# Patient Record
Sex: FEMALE | Race: WHITE | Employment: OTHER | URBAN - METROPOLITAN AREA
[De-identification: names, ages, dates, MRNs, and addresses within clinical notes are randomized per-mention and may not be internally consistent; named-entity substitution may affect disease eponyms.]

---

## 2018-02-26 ENCOUNTER — HOSPITAL ENCOUNTER (OUTPATIENT)
Dept: CT IMAGING | Age: 73
Discharge: HOME | End: 2018-02-26
Attending: ORTHOPAEDIC SURGERY
Payer: MEDICARE

## 2018-02-26 DIAGNOSIS — Z96.652 STATUS POST TOTAL LEFT KNEE REPLACEMENT: ICD-10-CM

## 2018-02-26 PROCEDURE — 73700 CT LOWER EXTREMITY W/O DYE: CPT

## 2018-11-05 ENCOUNTER — HOSPITAL ENCOUNTER (OUTPATIENT)
Dept: MRI IMAGING | Age: 73
Discharge: HOME | End: 2018-11-05
Attending: PAIN MEDICINE
Payer: MEDICARE

## 2018-11-05 ENCOUNTER — HOSPITAL ENCOUNTER (OUTPATIENT)
Dept: GENERAL RADIOLOGY | Age: 73
Discharge: HOME | End: 2018-11-05
Attending: PAIN MEDICINE
Payer: MEDICARE

## 2018-11-05 DIAGNOSIS — M46.1 SACROILIITIS, NOT ELSEWHERE CLASSIFIED (HCC): ICD-10-CM

## 2018-11-05 DIAGNOSIS — M48.07 SPINAL STENOSIS, LUMBOSACRAL REGION: ICD-10-CM

## 2018-11-05 DIAGNOSIS — G89.4 CHRONIC PAIN SYNDROME: ICD-10-CM

## 2018-11-05 DIAGNOSIS — M48.061 SPINAL STENOSIS, LUMBAR REGION, WITHOUT NEUROGENIC CLAUDICATION: ICD-10-CM

## 2018-11-05 DIAGNOSIS — M54.17 RADICULOPATHY, LUMBOSACRAL REGION: ICD-10-CM

## 2018-11-05 DIAGNOSIS — M54.16 RADICULOPATHY, LUMBAR REGION: ICD-10-CM

## 2018-11-05 PROCEDURE — 72148 MRI LUMBAR SPINE W/O DYE: CPT

## 2018-11-05 PROCEDURE — 72114 X-RAY EXAM L-S SPINE BENDING: CPT

## 2020-01-21 ENCOUNTER — HOSPITAL ENCOUNTER (OUTPATIENT)
Dept: PREADMISSION TESTING | Age: 75
Discharge: HOME | End: 2020-01-21
Payer: MEDICARE

## 2020-01-21 VITALS
TEMPERATURE: 98.3 F | SYSTOLIC BLOOD PRESSURE: 200 MMHG | BODY MASS INDEX: 30.55 KG/M2 | OXYGEN SATURATION: 96 % | HEART RATE: 106 BPM | RESPIRATION RATE: 18 BRPM | DIASTOLIC BLOOD PRESSURE: 100 MMHG | HEIGHT: 70 IN | WEIGHT: 213.44 LBS

## 2020-01-21 LAB
25(OH)D3 SERPL-MCNC: 54.6 NG/ML (ref 30–100)
ABO + RH BLD: NORMAL
ALBUMIN SERPL-MCNC: 3.8 G/DL (ref 3.5–5)
ALBUMIN/GLOB SERPL: 1.1 {RATIO} (ref 1.1–2.2)
ALP SERPL-CCNC: 64 U/L (ref 45–117)
ALT SERPL-CCNC: 21 U/L (ref 12–78)
ANION GAP SERPL CALC-SCNC: 4 MMOL/L (ref 5–15)
APPEARANCE UR: CLEAR
APTT PPP: 25.1 SEC (ref 22.1–32)
AST SERPL-CCNC: 18 U/L (ref 15–37)
ATRIAL RATE: 94 BPM
BACTERIA URNS QL MICRO: NEGATIVE /HPF
BASOPHILS # BLD: 0 K/UL (ref 0–0.1)
BASOPHILS NFR BLD: 1 % (ref 0–1)
BILIRUB SERPL-MCNC: 0.5 MG/DL (ref 0.2–1)
BILIRUB UR QL: NEGATIVE
BLOOD GROUP ANTIBODIES SERPL: NORMAL
BUN SERPL-MCNC: 22 MG/DL (ref 6–20)
BUN/CREAT SERPL: 31 (ref 12–20)
CALCIUM SERPL-MCNC: 9.4 MG/DL (ref 8.5–10.1)
CALCULATED P AXIS, ECG09: 57 DEGREES
CALCULATED T AXIS, ECG11: 57 DEGREES
CHLORIDE SERPL-SCNC: 109 MMOL/L (ref 97–108)
CO2 SERPL-SCNC: 29 MMOL/L (ref 21–32)
COLOR UR: NORMAL
CREAT SERPL-MCNC: 0.71 MG/DL (ref 0.55–1.02)
DIAGNOSIS, 93000: NORMAL
DIFFERENTIAL METHOD BLD: NORMAL
EOSINOPHIL # BLD: 0 K/UL (ref 0–0.4)
EOSINOPHIL NFR BLD: 1 % (ref 0–7)
EPITH CASTS URNS QL MICRO: NORMAL /LPF
ERYTHROCYTE [DISTWIDTH] IN BLOOD BY AUTOMATED COUNT: 12.9 % (ref 11.5–14.5)
EST. AVERAGE GLUCOSE BLD GHB EST-MCNC: 123 MG/DL
GLOBULIN SER CALC-MCNC: 3.6 G/DL (ref 2–4)
GLUCOSE SERPL-MCNC: 106 MG/DL (ref 65–100)
GLUCOSE UR STRIP.AUTO-MCNC: NEGATIVE MG/DL
HBA1C MFR BLD: 5.9 % (ref 4–5.6)
HCT VFR BLD AUTO: 44.2 % (ref 35–47)
HGB BLD-MCNC: 14.3 G/DL (ref 11.5–16)
HGB UR QL STRIP: NEGATIVE
HYALINE CASTS URNS QL MICRO: NORMAL /LPF (ref 0–5)
IMM GRANULOCYTES # BLD AUTO: 0 K/UL (ref 0–0.04)
IMM GRANULOCYTES NFR BLD AUTO: 0 % (ref 0–0.5)
INR PPP: 1 (ref 0.9–1.1)
KETONES UR QL STRIP.AUTO: NEGATIVE MG/DL
LEUKOCYTE ESTERASE UR QL STRIP.AUTO: NEGATIVE
LYMPHOCYTES # BLD: 1.3 K/UL (ref 0.8–3.5)
LYMPHOCYTES NFR BLD: 33 % (ref 12–49)
MCH RBC QN AUTO: 29.9 PG (ref 26–34)
MCHC RBC AUTO-ENTMCNC: 32.4 G/DL (ref 30–36.5)
MCV RBC AUTO: 92.5 FL (ref 80–99)
MONOCYTES # BLD: 0.3 K/UL (ref 0–1)
MONOCYTES NFR BLD: 8 % (ref 5–13)
NEUTS SEG # BLD: 2.3 K/UL (ref 1.8–8)
NEUTS SEG NFR BLD: 58 % (ref 32–75)
NITRITE UR QL STRIP.AUTO: NEGATIVE
NRBC # BLD: 0 K/UL (ref 0–0.01)
NRBC BLD-RTO: 0 PER 100 WBC
P-R INTERVAL, ECG05: 148 MS
PH UR STRIP: 6.5 [PH] (ref 5–8)
PLATELET # BLD AUTO: 157 K/UL (ref 150–400)
POTASSIUM SERPL-SCNC: 4.8 MMOL/L (ref 3.5–5.1)
PROT SERPL-MCNC: 7.4 G/DL (ref 6.4–8.2)
PROT UR STRIP-MCNC: NEGATIVE MG/DL
PROTHROMBIN TIME: 10.3 SEC (ref 9–11.1)
Q-T INTERVAL, ECG07: 372 MS
QRS DURATION, ECG06: 96 MS
QTC CALCULATION (BEZET), ECG08: 465 MS
RBC # BLD AUTO: 4.78 M/UL (ref 3.8–5.2)
RBC #/AREA URNS HPF: NORMAL /HPF (ref 0–5)
SODIUM SERPL-SCNC: 142 MMOL/L (ref 136–145)
SP GR UR REFRACTOMETRY: <1.005 (ref 1–1.03)
SPECIMEN EXP DATE BLD: NORMAL
THERAPEUTIC RANGE,PTTT: NORMAL SECS (ref 58–77)
UA: UC IF INDICATED,UAUC: NORMAL
UROBILINOGEN UR QL STRIP.AUTO: 0.2 EU/DL (ref 0.2–1)
VENTRICULAR RATE, ECG03: 94 BPM
WBC # BLD AUTO: 4 K/UL (ref 3.6–11)
WBC URNS QL MICRO: NORMAL /HPF (ref 0–4)

## 2020-01-21 PROCEDURE — 82306 VITAMIN D 25 HYDROXY: CPT

## 2020-01-21 PROCEDURE — 80053 COMPREHEN METABOLIC PANEL: CPT

## 2020-01-21 PROCEDURE — 83036 HEMOGLOBIN GLYCOSYLATED A1C: CPT

## 2020-01-21 PROCEDURE — 85025 COMPLETE CBC W/AUTO DIFF WBC: CPT

## 2020-01-21 PROCEDURE — 85730 THROMBOPLASTIN TIME PARTIAL: CPT

## 2020-01-21 PROCEDURE — 86900 BLOOD TYPING SEROLOGIC ABO: CPT

## 2020-01-21 PROCEDURE — 36415 COLL VENOUS BLD VENIPUNCTURE: CPT

## 2020-01-21 PROCEDURE — 81001 URINALYSIS AUTO W/SCOPE: CPT

## 2020-01-21 PROCEDURE — 85610 PROTHROMBIN TIME: CPT

## 2020-01-21 PROCEDURE — 93005 ELECTROCARDIOGRAM TRACING: CPT

## 2020-01-21 RX ORDER — PANTOPRAZOLE SODIUM 40 MG/1
40 TABLET, DELAYED RELEASE ORAL DAILY
COMMUNITY

## 2020-01-21 RX ORDER — CHOLECALCIFEROL TAB 125 MCG (5000 UNIT) 125 MCG
5000 TAB ORAL DAILY
COMMUNITY

## 2020-01-21 RX ORDER — PREGABALIN 75 MG/1
75 CAPSULE ORAL 2 TIMES DAILY
COMMUNITY
End: 2020-05-06

## 2020-01-21 NOTE — H&P
Preoperative Evaluation                     History and Physical with Surgical Risk Stratification     1/21/2020    CC: Low back pain  Surgery: L 2-5 Laminectomy     HPI:   Rhonda Conrad is a 75 y.o. female referred for pre-operative evaluation by Dr. Jones Ortiz for surgery on 1/27/20. Ms. Rishi Edmonds comes to [facility] today for her upcoming low back surgery. HTN: She is well aware, along with her PCP and Cardiologist of her elevated BP when she is in medical situations. Her BP will increase to 200's/100's. She brought in a two page sheet showing daily BP checks at home that are well within normal range, 110-125 SBP. She denies headache or vision changes with her elevated BP. Back pain: She notes she has low back pain that has started to impact her daily life. She is very active and wants to be able to walk without pain. She denies buckling or falls. She notes she has left leg tingling especially after a BM and right leg pain. Sitting she has no pain. The pain will wake her up. Walking too long increases her pain. The patient was evaluated in the surgeon's office and it was determined that the most appropriate plan of care is to proceed with surgical intervention. Patient's PCP Pablo Yu MD    Review of Systems     Constitutional: Negative for chills and fever  HENT: Negative for congestion and sore throat  Eyes: negative for blurred vision and double vision  Respiratory: Negative for cough, shortness of breath and wheezing  Mouth: Negative for loose, broken or chipped teeth. Cardiovascular: Negative for chest pain and palpitations  Gastrointestinal: Negative for abdominal pain, constipation, diarrhea and nausea  Genitourinary: Negative for dysuria and hematuria  Musculoskeletal: Positive for low back pain  Skin: Negative for rash, open wounds. Negative for bruises easily  Neurological: Negative for dizziness, tremors and headaches  Psychiatric: Negative for depression.  The patient is not nervous/anxious. Inherent Risk of Surgery     Surgical risk:  Intermediate  Low:  EIntermediate:   Spinal surgery,High:    Patient Cardiac Risk Assessment     Revised Cardiac Risk Index (RCRI)    Rate if cardiac death, nonfatal MI, nonfatal cardiac arrest by number of risk factor- 0.4%    ANDRA/AHA 2007 Guidelines:   1) Surgery Emergency, Non-cardiac -> to surgery  2) If not, look at clinical predictors    Major Intermediate Minor       Blood Thinner: None    METS     >4 METS Climb a flight of stairs or a hill Walk on level ground at 4 mph Run a short distance Heavy work around house (scrub floors, move furniture)     Other Risk Factors:   Screening for ETOH use:  Done and low risk  Smoking status:   None    Personal or FH of bleeding problems:  No  Personal or FH of blood clots:  No  Personal or FH of anesthesia problems:  Yes    Pulmonary Risk:  Asthma or COPD:  No  Body mass index is 30.63 kg/m². Known Central Sleep apnea  Albumin normal, BUN abnormal    Past Medical, Surgical, Social History     Allergies: Allergies   Allergen Reactions    Adhesive Tape-Silicones Other (comments)     Skin tears       Patient did bring in medication list/bottles to review. Medication Documentation Review Audit     Reviewed by Kandy Solomon RN (Registered Nurse) on 01/21/20 at 1032    Medication Sig Documenting Provider Last Dose Status Taking?   atorvastatin (LIPITOR) 20 mg tablet Take 20 mg by mouth every evening. Indications: high cholesterol Provider, Historical  Active    cetirizine (ZYRTEC) 10 mg tablet Take 10 mg by mouth nightly as needed. Indications: SEASONAL ALLERGIC RHINITIS Provider, Historical  Active    cholecalciferol (VITAMIN D3) (5000 Units/125 mcg) tab tablet Take 5,000 Units by mouth daily. Provider, Historical  Active Yes   coenzyme q10-vitamin e (COQ10 ) 100-100 mg-unit cap Take 200 mg by mouth daily (after breakfast).  Provider, Historical  Active    conjugated estrogens (PREMARIN) 0.3 mg tablet Take 0.3 mg by mouth every evening. Provider, Historical  Active    cyanocobalamin (VITAMIN B-12) 1,000 mcg tablet Take 2,500 mcg by mouth daily (after breakfast). Provider, Historical  Active    eszopiclone (LUNESTA) 2 mg tablet Take 2 mg by mouth nightly as needed. Provider, Historical  Active    levothyroxine (SYNTHROID) 112 mcg tablet Take 112 mcg by mouth every morning. NO GENERIC>>>>MUST HAVE BRAND NAME Provider, Historical  Active            Med Note NILAM Wick Jan 21, 2020 10:03 AM)     liothyronine (CYTOMEL) 5 mcg tablet Take 5 mcg by mouth daily (after lunch). Provider, Historical  Active    losartan (COZAAR) 50 mg tablet Take 50 mg by mouth nightly. Provider, Historical  Active    MULTI-VITAMIN PO Take 1 Tab by mouth daily (after breakfast). Provider, Historical  Active    omega-3 acid ethyl esters (LOVAZA) 1 gram capsule Take 1 g by mouth two (2) times daily (with meals). Provider, Historical  Active            Med Brenda Maria, NILAM Weller Jan 21, 2020 10:26 AM)     pantoprazole (PROTONIX) 40 mg tablet Take 40 mg by mouth daily. Provider, Historical  Active Yes   potassium 99 mg tablet Take 99 mg by mouth daily. Provider, Historical  Active Yes   pregabalin (LYRICA) 75 mg capsule Take 75 mg by mouth two (2) times a day. Provider, Historical  Active Yes   vit A/vit C/vit E/zinc/copper (PRESERVISION AREDS PO) Take 1 Tab by mouth two (2) times a day.  Provider, Historical  Active Yes              Past Medical History:   Diagnosis Date    Allergic rhinitis     Arthritis     Osteo    Central sleep apnea     uses a mouth piece    Chronic pain     arthritis pain    Difficult intubation 2011    during total surgery at [facility] 2011- anesthesia note in [facility] care reported no complications      GERD (gastroesophageal reflux disease)     Hx of echocardiogram 01/28/2019    Normal- Kelly Rico    Hyperlipidemia     Hypertension     Well controlled with medications- see BP list in chart    Melanoma ([facility] [address]  & 2015    x 3    PONV (postoperative nausea and vomiting)     Prediabetes 2020    5.9    Thyroid cancer ([facility] [address] 2013    White coat syndrome with hypertension     her BP will increase to 200's/100's- Cardio and PCP aware, runs normal at home     Past Surgical History:   Procedure Laterality Date    HX CHOLECYSTECTOMY  2006    HX DILATION AND CURETTAGE      over 36 years ago    HX HIP REPLACEMENT Right 2001    HX HYSTERECTOMY  2012    HX KNEE REPLACEMENT Left 2015    HX MALIGNANT SKIN LESION EXCISION  ,,    HX MENISCUS REPAIR Left 2015    HX SHOULDER REPLACEMENT Bilateral  &     HX THYROIDECTOMY  2013     Social History     Tobacco Use    Smoking status: Former Smoker     Packs/day: 0.25     Years: 1.00     Pack years: 0.25     Types: Cigarettes     Last attempt to quit: 1962     Years since quittin.3    Smokeless tobacco: Never Used   Substance Use Topics    Alcohol use: No    Drug use: No     Family History   Problem Relation Age of Onset    Heart Disease Father     Heart Attack Father     Heart Disease Sister     Diabetes Sister     Heart Attack Sister 48    Dementia Mother     Post-op Nausea/Vomiting Daughter     Anesth Problems Neg Hx     Deep Vein Thrombosis Neg Hx        Objective     Vitals:    20 1007   BP: (!) 200/100   Pulse: (!) 106   Resp: 18   Temp: 98.3 °F (36.8 °C)   SpO2: 96%   Weight: 96.8 kg (213 lb 7 oz)   Height: 5' 10\" (1.778 m)       Constitutional:  Appears well,  No Acute Distress, Vitals noted  Psychiatric:   Affect normal, Alert and Oriented to person/place/time    Eyes:   Pupils equally round and reactive, EOMI, conjunctiva clear, eyelids normal  ENT:   External ears and nose normal/lips, teeth normal, gums normal, TMs and Orophyarynx normal  Neck:   General inspection and Thyroid normal.  No abnormal cervical or supraclavicular nodes    Lungs:   Clear to auscultation, good respiratory effort  Heart: Ausculation normal.  Regular rhythm. Tachycardia. No cardiac murmurs. No carotid bruits or palpable thrills  Chest wall normal  Musculoskeletal: Gait antalgic. Extremities:   Without edema, good peripheral pulses  Skin:   Warm to palpation, without rashes, bruising, or suspicious lesions       DVT /PE Risk Assessment    Bedridden for more than 3 days or major surgery within the last 4 weeks NO    Active cancer NO    Calf swelling >3 cm compared to other leg NO    Collateral superficial veins present NO    Entire leg swollen NO    Tenderness along the deep venous system & or pitting edema confined to symptomatic leg NO    Cast applied to lower limb, paralysis NO    Previous DVT NO      Recent Results (from the past 72 hour(s))   CBC WITH AUTOMATED DIFF    Collection Time: 01/21/20 11:03 AM   Result Value Ref Range    WBC 4.0 3.6 - 11.0 K/uL    RBC 4.78 3.80 - 5.20 M/uL    HGB 14.3 11.5 - 16.0 g/dL    HCT 44.2 35.0 - 47.0 %    MCV 92.5 80.0 - 99.0 FL    MCH 29.9 26.0 - 34.0 PG    MCHC 32.4 30.0 - 36.5 g/dL    RDW 12.9 11.5 - 14.5 %    PLATELET 515 174 - 942 K/uL    NRBC 0.0 0  WBC    ABSOLUTE NRBC 0.00 0.00 - 0.01 K/uL    NEUTROPHILS 58 32 - 75 %    LYMPHOCYTES 33 12 - 49 %    MONOCYTES 8 5 - 13 %    EOSINOPHILS 1 0 - 7 %    BASOPHILS 1 0 - 1 %    IMMATURE GRANULOCYTES 0 0.0 - 0.5 %    ABS. NEUTROPHILS 2.3 1.8 - 8.0 K/UL    ABS. LYMPHOCYTES 1.3 0.8 - 3.5 K/UL    ABS. MONOCYTES 0.3 0.0 - 1.0 K/UL    ABS. EOSINOPHILS 0.0 0.0 - 0.4 K/UL    ABS. BASOPHILS 0.0 0.0 - 0.1 K/UL    ABS. IMM.  GRANS. 0.0 0.00 - 0.04 K/UL    DF AUTOMATED     METABOLIC PANEL, COMPREHENSIVE    Collection Time: 01/21/20 11:03 AM   Result Value Ref Range    Sodium 142 136 - 145 mmol/L    Potassium 4.8 3.5 - 5.1 mmol/L    Chloride 109 (H) 97 - 108 mmol/L    CO2 29 21 - 32 mmol/L    Anion gap 4 (L) 5 - 15 mmol/L    Glucose 106 (H) 65 - 100 mg/dL    BUN 22 (H) 6 - 20 MG/DL    Creatinine 0.71 0.55 - 1.02 MG/DL    BUN/Creatinine ratio 31 (H) 12 - 20      GFR est AA >60 >60 ml/min/1.73m2    GFR est non-AA >60 >60 ml/min/1.73m2    Calcium 9.4 8.5 - 10.1 MG/DL    Bilirubin, total 0.5 0.2 - 1.0 MG/DL    ALT (SGPT) 21 12 - 78 U/L    AST (SGOT) 18 15 - 37 U/L    Alk.  phosphatase 64 45 - 117 U/L    Protein, total 7.4 6.4 - 8.2 g/dL    Albumin 3.8 3.5 - 5.0 g/dL    Globulin 3.6 2.0 - 4.0 g/dL    A-G Ratio 1.1 1.1 - 2.2     HEMOGLOBIN A1C WITH EAG    Collection Time: 01/21/20 11:03 AM   Result Value Ref Range    Hemoglobin A1c 5.9 (H) 4.0 - 5.6 %    Est. average glucose 123 mg/dL   CULTURE, MRSA    Collection Time: 01/21/20 11:03 AM   Result Value Ref Range    Special Requests: NO SPECIAL REQUESTS      Culture result: MRSA NOT PRESENT AT 15 HOURS     PROTHROMBIN TIME + INR    Collection Time: 01/21/20 11:03 AM   Result Value Ref Range    INR 1.0 0.9 - 1.1      Prothrombin time 10.3 9.0 - 11.1 sec   PTT    Collection Time: 01/21/20 11:03 AM   Result Value Ref Range    aPTT 25.1 22.1 - 32.0 sec    aPTT, therapeutic range     58.0 - 77.0 SECS   URINALYSIS W/ REFLEX CULTURE    Collection Time: 01/21/20 11:03 AM   Result Value Ref Range    Color YELLOW/STRAW      Appearance CLEAR CLEAR      Specific gravity <1.005 1.003 - 1.030    pH (UA) 6.5 5.0 - 8.0      Protein NEGATIVE  NEG mg/dL    Glucose NEGATIVE  NEG mg/dL    Ketone NEGATIVE  NEG mg/dL    Bilirubin NEGATIVE  NEG      Blood NEGATIVE  NEG      Urobilinogen 0.2 0.2 - 1.0 EU/dL    Nitrites NEGATIVE  NEG      Leukocyte Esterase NEGATIVE  NEG      WBC 0-4 0 - 4 /hpf    RBC 0-5 0 - 5 /hpf    Epithelial cells FEW FEW /lpf    Bacteria NEGATIVE  NEG /hpf    UA:UC IF INDICATED CULTURE NOT INDICATED BY UA RESULT CNI      Hyaline cast 0-2 0 - 5 /lpf   TYPE & SCREEN    Collection Time: 01/21/20 11:03 AM   Result Value Ref Range    Crossmatch Expiration 01/30/2020     ABO/Rh(D) A POSITIVE     Antibody screen NEG    VITAMIN D, 25 HYDROXY    Collection Time: 01/21/20 11:03 AM   Result Value Ref Range    Vitamin D 25-Hydroxy 54.6 30 - 100 ng/mL   EKG, 12 LEAD, INITIAL    Collection Time: 01/21/20 11:22 AM   Result Value Ref Range    Ventricular Rate 94 BPM    Atrial Rate 94 BPM    P-R Interval 148 ms    QRS Duration 96 ms    Q-T Interval 372 ms    QTC Calculation (Bezet) 465 ms    Calculated P Axis 57 degrees    Calculated T Axis 57 degrees    Diagnosis       Normal sinus rhythm  Normal ECG  When compared with ECG of 16-SEP-2015 15:28,  No significant change was found  Confirmed by Carolynn Munoz MD., Jcarlos Perkins ([id number]) on 1/21/2020 12:29:57 PM         Assessment and Plan     Assessment/Plan:   1) Lumbar Stenosis  2) Pre-Operative Evaluation    Labs and EKG reviewed. MRSA pending    BP readings reviewed and placed in chart  Note from Dr. Siddhartha Parnell r/t ECHO reviewed and placed in chart    Preoperative Clearance  Per RCRI, the patient has a 0.4% risk of cardiac death, nonfatal MI, nonfatal cardiac arrest based on no risk factors. Per ACC/AHA guidelines, patient is low risk for a(n) intermediate risk surgery and may proceed to planned surgery with the above noted risk.     Rudy Castillo, NP

## 2020-01-21 NOTE — PERIOP NOTES
[address], [address] [facility]   MAIN OR                                  [phone]   MAIN PRE OP                          [phone]                                                                                AMBULATORY PRE OP          [phone]  PRE-ADMISSION TESTING    [phone]   Surgery Date:   Monday, Jan 27, 2020         Is surgery arrival time given by surgeon? NO  If NO, [facility] staff will call you between 3 and 7pm the day before your surgery with your arrival time. (If your surgery is on a Monday, we will call you the Friday before.)    Call [phone] after 7pm Monday-Friday if you did not receive this call. INSTRUCTIONS BEFORE YOUR SURGERY   When You  Arrive Arrive at the 2nd [address] [facility] on the day of your surgery  Have your insurance card, photo ID, and any copayment (if needed)   Food   and   Drink NO food or drink after midnight the night before surgery    This means NO water, gum, mints, coffee, juice, etc.  No alcohol (beer, wine, liquor) 24 hours before and after surgery   Medications to   TAKE   Morning of Surgery MEDICATIONS TO TAKE THE MORNING OF SURGERY WITH A SIP OF WATER:    Lyrica   Protonix   Synthroid,   cytomel   Medications  To  STOP      7 days before surgery  Non-Steroidal anti-inflammatory Drugs (NSAID's): for example, Ibuprofen (Advil, Motrin), Naproxen (Aleve)   Aspirin, if taking for pain    Herbal supplements, vitamins, and fish oil   Other:  (Pain medications not listed above, including Tylenol may be taken)       Bathing Clothing  Jewelry  Valuables      If you shower the morning of surgery, please do not apply anything to your skin (lotions, powders, deodorant, or makeup, especially mascara)   Follow Chlorhexidine Care Fusion body wash instructions provided to you during PAT appointment. Begin 3 days prior to surgery.    Do not shave or trim anywhere 24 hours before surgery   Wear your hair loose or down; no pony-tails, buns, or metal hair clips   Wear loose, comfortable, clean clothes   Wear glasses instead of contacts   Leave money, valuables, and jewelry, including body piercings, at home   Going Home - or Spending the Night  SAME-DAY SURGERY: You must have a responsible adult drive you home and stay with you 24 hours after surgery   ADMITS: If your doctor is keeping you in the hospital after surgery, leave personal belongings/luggage in your car until you have a hospital room number. Hospital discharge time is 12 noon  Drivers must be here before 12 noon unless you are told differently   Special Instructions Please bring dental appliance day of surgery. May use  parking for free after 7am until 5pm  Follow instructions given in spine class. Follow all instructions so your surgery wont be cancelled. Please, be on time. If a situation occurs and you are delayed the day of surgery, call [phone]. If your physical condition changes (like a fever, cold, flu, etc.) call your surgeon. Home medication(s) reviewed and verified with patient during PAT appointment. The patient was contacted  in person. The patient verbalizes understanding of all instructions and does not  need reinforcement.

## 2020-01-21 NOTE — PERIOP NOTES
Pt reports she has severe \"white coat syndrome\" during any medical procedure. Pt's BP on arrival was 238/117 and was repeated manually x 2. Pt provided report from cardiologist that states she is cleared for surgery as well as may days worth of home BP readings.

## 2020-01-22 LAB
BACTERIA SPEC CULT: NORMAL
BACTERIA SPEC CULT: NORMAL
SERVICE CMNT-IMP: NORMAL

## 2020-01-27 ENCOUNTER — HOSPITAL ENCOUNTER (INPATIENT)
Age: 75
LOS: 4 days | Discharge: HOME HEALTH | DRG: 455 | End: 2020-01-31
Attending: ORTHOPAEDIC SURGERY | Admitting: ORTHOPAEDIC SURGERY
Payer: MEDICARE

## 2020-01-27 ENCOUNTER — APPOINTMENT (OUTPATIENT)
Dept: GENERAL RADIOLOGY | Age: 75
DRG: 455 | End: 2020-01-27
Attending: ORTHOPAEDIC SURGERY
Payer: MEDICARE

## 2020-01-27 ENCOUNTER — ANESTHESIA (OUTPATIENT)
Dept: SURGERY | Age: 75
DRG: 455 | End: 2020-01-27
Payer: MEDICARE

## 2020-01-27 ENCOUNTER — ANESTHESIA EVENT (OUTPATIENT)
Dept: SURGERY | Age: 75
DRG: 455 | End: 2020-01-27
Payer: MEDICARE

## 2020-01-27 DIAGNOSIS — M48.062 LUMBAR STENOSIS WITH NEUROGENIC CLAUDICATION: Primary | ICD-10-CM

## 2020-01-27 LAB
GLUCOSE BLD STRIP.AUTO-MCNC: 166 MG/DL (ref 65–100)
GLUCOSE BLD STRIP.AUTO-MCNC: 166 MG/DL (ref 65–100)
SERVICE CMNT-IMP: ABNORMAL
SERVICE CMNT-IMP: ABNORMAL

## 2020-01-27 PROCEDURE — 77030002982 HC SUT POLYSRB J&J -A: Performed by: ORTHOPAEDIC SURGERY

## 2020-01-27 PROCEDURE — 74011250636 HC RX REV CODE- 250/636: Performed by: ANESTHESIOLOGY

## 2020-01-27 PROCEDURE — 76060000039 HC ANESTHESIA 4 TO 4.5 HR: Performed by: ORTHOPAEDIC SURGERY

## 2020-01-27 PROCEDURE — 77030008975 HC BN CANC CHP LIFV -E: Performed by: ORTHOPAEDIC SURGERY

## 2020-01-27 PROCEDURE — 77030039267 HC ADH SKN EXOFIN S2SG -B: Performed by: ORTHOPAEDIC SURGERY

## 2020-01-27 PROCEDURE — 74011000250 HC RX REV CODE- 250: Performed by: ORTHOPAEDIC SURGERY

## 2020-01-27 PROCEDURE — 77030018846 HC SOL IRR STRL H20 ICUM -A: Performed by: ORTHOPAEDIC SURGERY

## 2020-01-27 PROCEDURE — 77030037202 HC SPCR SPN BULL TIP PEK VBR IBF REGE -I1: Performed by: ORTHOPAEDIC SURGERY

## 2020-01-27 PROCEDURE — 77030033067 HC SUT PDO STRATFX SPIR J&J -B: Performed by: ORTHOPAEDIC SURGERY

## 2020-01-27 PROCEDURE — 77030029099 HC BN WAX SSPC -A: Performed by: ORTHOPAEDIC SURGERY

## 2020-01-27 PROCEDURE — 77030018723 HC ELCTRD BLD COVD -A: Performed by: ORTHOPAEDIC SURGERY

## 2020-01-27 PROCEDURE — 77030019908 HC STETH ESOPH SIMS -A: Performed by: ANESTHESIOLOGY

## 2020-01-27 PROCEDURE — 0SG10AJ FUSION OF 2 OR MORE LUMBAR VERTEBRAL JOINTS WITH INTERBODY FUSION DEVICE, POSTERIOR APPROACH, ANTERIOR COLUMN, OPEN APPROACH: ICD-10-PCS | Performed by: ORTHOPAEDIC SURGERY

## 2020-01-27 PROCEDURE — 74011250636 HC RX REV CODE- 250/636: Performed by: NURSE ANESTHETIST, CERTIFIED REGISTERED

## 2020-01-27 PROCEDURE — 0SG107J FUSION OF 2 OR MORE LUMBAR VERTEBRAL JOINTS WITH AUTOLOGOUS TISSUE SUBSTITUTE, POSTERIOR APPROACH, ANTERIOR COLUMN, OPEN APPROACH: ICD-10-PCS | Performed by: ORTHOPAEDIC SURGERY

## 2020-01-27 PROCEDURE — 74011250636 HC RX REV CODE- 250/636: Performed by: ORTHOPAEDIC SURGERY

## 2020-01-27 PROCEDURE — 77030040466 HC GRFT MATRIX VIBONE REGE -I1: Performed by: ORTHOPAEDIC SURGERY

## 2020-01-27 PROCEDURE — 72020 X-RAY EXAM OF SPINE 1 VIEW: CPT

## 2020-01-27 PROCEDURE — 76010000175 HC OR TIME 4 TO 4.5 HR INTENSV-TIER 1: Performed by: ORTHOPAEDIC SURGERY

## 2020-01-27 PROCEDURE — 77030008684 HC TU ET CUF COVD -B: Performed by: ANESTHESIOLOGY

## 2020-01-27 PROCEDURE — 74011250637 HC RX REV CODE- 250/637: Performed by: ORTHOPAEDIC SURGERY

## 2020-01-27 PROCEDURE — 77030037134 HC WRAP COMPR BACK THER SOLM -B

## 2020-01-27 PROCEDURE — 77030026438 HC STYL ET INTUB CARD -A: Performed by: ANESTHESIOLOGY

## 2020-01-27 PROCEDURE — 77030031139 HC SUT VCRL2 J&J -A: Performed by: ORTHOPAEDIC SURGERY

## 2020-01-27 PROCEDURE — C1713 ANCHOR/SCREW BN/BN,TIS/BN: HCPCS | Performed by: ORTHOPAEDIC SURGERY

## 2020-01-27 PROCEDURE — 77030013079 HC BLNKT BAIR HGGR 3M -A: Performed by: ANESTHESIOLOGY

## 2020-01-27 PROCEDURE — 77030003666 HC NDL SPINAL BD -A: Performed by: ORTHOPAEDIC SURGERY

## 2020-01-27 PROCEDURE — 65270000029 HC RM PRIVATE

## 2020-01-27 PROCEDURE — 0SG1071 FUSION OF 2 OR MORE LUMBAR VERTEBRAL JOINTS WITH AUTOLOGOUS TISSUE SUBSTITUTE, POSTERIOR APPROACH, POSTERIOR COLUMN, OPEN APPROACH: ICD-10-PCS | Performed by: ORTHOPAEDIC SURGERY

## 2020-01-27 PROCEDURE — 77030040356 HC CORD BPLR FRCP COVD -A: Performed by: ORTHOPAEDIC SURGERY

## 2020-01-27 PROCEDURE — 82962 GLUCOSE BLOOD TEST: CPT

## 2020-01-27 PROCEDURE — 77030004391 HC BUR FLUT MEDT -C: Performed by: ORTHOPAEDIC SURGERY

## 2020-01-27 PROCEDURE — 74011000250 HC RX REV CODE- 250: Performed by: NURSE ANESTHETIST, CERTIFIED REGISTERED

## 2020-01-27 PROCEDURE — 77030002924 HC SUT GORTX WLGO -B: Performed by: ORTHOPAEDIC SURGERY

## 2020-01-27 PROCEDURE — 77030040922 HC BLNKT HYPOTHRM STRY -A

## 2020-01-27 PROCEDURE — 77030014650 HC SEAL MTRX FLOSEL BAXT -C: Performed by: ORTHOPAEDIC SURGERY

## 2020-01-27 PROCEDURE — 77030005513 HC CATH URETH FOL11 MDII -B: Performed by: ORTHOPAEDIC SURGERY

## 2020-01-27 PROCEDURE — 77030038552 HC DRN WND MDII -A: Performed by: ORTHOPAEDIC SURGERY

## 2020-01-27 PROCEDURE — 0ST20ZZ RESECTION OF LUMBAR VERTEBRAL DISC, OPEN APPROACH: ICD-10-PCS | Performed by: ORTHOPAEDIC SURGERY

## 2020-01-27 PROCEDURE — 00NY0ZZ RELEASE LUMBAR SPINAL CORD, OPEN APPROACH: ICD-10-PCS | Performed by: ORTHOPAEDIC SURGERY

## 2020-01-27 PROCEDURE — 77030003161 HC GRFT DURA MTRX INLC -E: Performed by: ORTHOPAEDIC SURGERY

## 2020-01-27 PROCEDURE — 74011000272 HC RX REV CODE- 272: Performed by: ORTHOPAEDIC SURGERY

## 2020-01-27 PROCEDURE — 76210000017 HC OR PH I REC 1.5 TO 2 HR: Performed by: ORTHOPAEDIC SURGERY

## 2020-01-27 PROCEDURE — 77030040361 HC SLV COMPR DVT MDII -B

## 2020-01-27 PROCEDURE — 77030002933 HC SUT MCRYL J&J -A: Performed by: ORTHOPAEDIC SURGERY

## 2020-01-27 PROCEDURE — 01NB0ZZ RELEASE LUMBAR NERVE, OPEN APPROACH: ICD-10-PCS | Performed by: ORTHOPAEDIC SURGERY

## 2020-01-27 DEVICE — DURAGEN® SUTURABLE DURAL REGENERATION MATRIX, 2 IN X 2 IN (5 CM X 5 CM)
Type: IMPLANTABLE DEVICE | Site: SPINE LUMBAR | Status: FUNCTIONAL
Brand: DURAGEN® SUTURABLE

## 2020-01-27 DEVICE — SCREW SPNL L45MM DIA65MM THORLUM REDUC STREAMLINE: Type: IMPLANTABLE DEVICE | Site: SPINE LUMBAR | Status: FUNCTIONAL

## 2020-01-27 DEVICE — BONE CHIP CANC CRSH 1-8MM 40ML -- PCAN1/2 PRESERVON: Type: IMPLANTABLE DEVICE | Site: SPINE LUMBAR | Status: FUNCTIONAL

## 2020-01-27 DEVICE — SPACER SPNL VERT BODY REPL BULL TIP 9 X 22 MM: Type: IMPLANTABLE DEVICE | Site: SPINE LUMBAR | Status: FUNCTIONAL

## 2020-01-27 DEVICE — 5.5MM CURVED ROD 90MM TI ALLOY
Type: IMPLANTABLE DEVICE | Site: SPINE LUMBAR | Status: FUNCTIONAL
Brand: TAURUS

## 2020-01-27 DEVICE — Z DUP USE 2731790 SUBSTITUTE BNE 10CC VIABLE MTRX VIBNE: Type: IMPLANTABLE DEVICE | Site: SPINE LUMBAR | Status: FUNCTIONAL

## 2020-01-27 DEVICE — SPACER SPNL 0.3CC W8XH8X6XL22MM PEEK CNVX BULL TIP TANT MRK: Type: IMPLANTABLE DEVICE | Site: SPINE LUMBAR | Status: FUNCTIONAL

## 2020-01-27 DEVICE — SCR SET SPNE STREAMLINE TL --: Type: IMPLANTABLE DEVICE | Site: SPINE LUMBAR | Status: FUNCTIONAL

## 2020-01-27 DEVICE — SPACER SPNL VERT BODY REPL BULL TIP 9 X 22 MM
Type: IMPLANTABLE DEVICE | Site: SPINE LUMBAR | Status: NON-FUNCTIONAL
Removed: 2020-05-11

## 2020-01-27 RX ORDER — DEXAMETHASONE SODIUM PHOSPHATE 4 MG/ML
INJECTION, SOLUTION INTRA-ARTICULAR; INTRALESIONAL; INTRAMUSCULAR; INTRAVENOUS; SOFT TISSUE AS NEEDED
Status: DISCONTINUED | OUTPATIENT
Start: 2020-01-27 | End: 2020-01-27 | Stop reason: HOSPADM

## 2020-01-27 RX ORDER — SODIUM CHLORIDE 0.9 % (FLUSH) 0.9 %
5-40 SYRINGE (ML) INJECTION EVERY 8 HOURS
Status: DISCONTINUED | OUTPATIENT
Start: 2020-01-27 | End: 2020-01-31 | Stop reason: HOSPADM

## 2020-01-27 RX ORDER — LIOTHYRONINE SODIUM 5 UG/1
5 TABLET ORAL
Status: DISCONTINUED | OUTPATIENT
Start: 2020-01-28 | End: 2020-01-31 | Stop reason: HOSPADM

## 2020-01-27 RX ORDER — DIPHENHYDRAMINE HYDROCHLORIDE 50 MG/ML
12.5 INJECTION, SOLUTION INTRAMUSCULAR; INTRAVENOUS AS NEEDED
Status: DISCONTINUED | OUTPATIENT
Start: 2020-01-27 | End: 2020-01-27 | Stop reason: HOSPADM

## 2020-01-27 RX ORDER — SODIUM CHLORIDE, SODIUM LACTATE, POTASSIUM CHLORIDE, CALCIUM CHLORIDE 600; 310; 30; 20 MG/100ML; MG/100ML; MG/100ML; MG/100ML
125 INJECTION, SOLUTION INTRAVENOUS CONTINUOUS
Status: DISCONTINUED | OUTPATIENT
Start: 2020-01-27 | End: 2020-01-27 | Stop reason: HOSPADM

## 2020-01-27 RX ORDER — SCOLOPAMINE TRANSDERMAL SYSTEM 1 MG/1
1 PATCH, EXTENDED RELEASE TRANSDERMAL
Status: DISCONTINUED | OUTPATIENT
Start: 2020-01-27 | End: 2020-01-27 | Stop reason: HOSPADM

## 2020-01-27 RX ORDER — AMOXICILLIN 250 MG
1 CAPSULE ORAL 2 TIMES DAILY
Status: DISCONTINUED | OUTPATIENT
Start: 2020-01-27 | End: 2020-01-31 | Stop reason: HOSPADM

## 2020-01-27 RX ORDER — LIDOCAINE HYDROCHLORIDE 10 MG/ML
0.1 INJECTION, SOLUTION EPIDURAL; INFILTRATION; INTRACAUDAL; PERINEURAL AS NEEDED
Status: DISCONTINUED | OUTPATIENT
Start: 2020-01-27 | End: 2020-01-27 | Stop reason: HOSPADM

## 2020-01-27 RX ORDER — NALOXONE HYDROCHLORIDE 0.4 MG/ML
0.4 INJECTION, SOLUTION INTRAMUSCULAR; INTRAVENOUS; SUBCUTANEOUS AS NEEDED
Status: DISCONTINUED | OUTPATIENT
Start: 2020-01-27 | End: 2020-01-31 | Stop reason: HOSPADM

## 2020-01-27 RX ORDER — FLUMAZENIL 0.1 MG/ML
0.2 INJECTION INTRAVENOUS
Status: DISCONTINUED | OUTPATIENT
Start: 2020-01-27 | End: 2020-01-27 | Stop reason: HOSPADM

## 2020-01-27 RX ORDER — PROPOFOL 10 MG/ML
INJECTION, EMULSION INTRAVENOUS
Status: DISCONTINUED | OUTPATIENT
Start: 2020-01-27 | End: 2020-01-27 | Stop reason: HOSPADM

## 2020-01-27 RX ORDER — POLYETHYLENE GLYCOL 3350 17 G/17G
17 POWDER, FOR SOLUTION ORAL DAILY
Status: DISCONTINUED | OUTPATIENT
Start: 2020-01-28 | End: 2020-01-31 | Stop reason: HOSPADM

## 2020-01-27 RX ORDER — SODIUM CHLORIDE 9 MG/ML
125 INJECTION, SOLUTION INTRAVENOUS CONTINUOUS
Status: DISPENSED | OUTPATIENT
Start: 2020-01-27 | End: 2020-01-28

## 2020-01-27 RX ORDER — LABETALOL HCL 20 MG/4 ML
10 SYRINGE (ML) INTRAVENOUS
Status: DISCONTINUED | OUTPATIENT
Start: 2020-01-27 | End: 2020-01-27 | Stop reason: HOSPADM

## 2020-01-27 RX ORDER — HYDROMORPHONE HYDROCHLORIDE 2 MG/ML
INJECTION, SOLUTION INTRAMUSCULAR; INTRAVENOUS; SUBCUTANEOUS AS NEEDED
Status: DISCONTINUED | OUTPATIENT
Start: 2020-01-27 | End: 2020-01-27 | Stop reason: HOSPADM

## 2020-01-27 RX ORDER — PANTOPRAZOLE SODIUM 40 MG/1
40 TABLET, DELAYED RELEASE ORAL
Status: DISCONTINUED | OUTPATIENT
Start: 2020-01-28 | End: 2020-01-31 | Stop reason: HOSPADM

## 2020-01-27 RX ORDER — CHOLECALCIFEROL TAB 125 MCG (5000 UNIT) 125 MCG
5000 TAB ORAL DAILY
Status: DISCONTINUED | OUTPATIENT
Start: 2020-01-28 | End: 2020-01-31 | Stop reason: HOSPADM

## 2020-01-27 RX ORDER — CYCLOBENZAPRINE HCL 10 MG
10 TABLET ORAL
Status: DISCONTINUED | OUTPATIENT
Start: 2020-01-27 | End: 2020-01-31 | Stop reason: HOSPADM

## 2020-01-27 RX ORDER — ATORVASTATIN CALCIUM 20 MG/1
20 TABLET, FILM COATED ORAL EVERY EVENING
Status: DISCONTINUED | OUTPATIENT
Start: 2020-01-27 | End: 2020-01-31 | Stop reason: HOSPADM

## 2020-01-27 RX ORDER — ACETAMINOPHEN 325 MG/1
650 TABLET ORAL
Status: DISCONTINUED | OUTPATIENT
Start: 2020-01-27 | End: 2020-01-31 | Stop reason: HOSPADM

## 2020-01-27 RX ORDER — MIDAZOLAM HYDROCHLORIDE 1 MG/ML
INJECTION, SOLUTION INTRAMUSCULAR; INTRAVENOUS AS NEEDED
Status: DISCONTINUED | OUTPATIENT
Start: 2020-01-27 | End: 2020-01-27 | Stop reason: HOSPADM

## 2020-01-27 RX ORDER — PREGABALIN 75 MG/1
75 CAPSULE ORAL 2 TIMES DAILY
Status: DISCONTINUED | OUTPATIENT
Start: 2020-01-27 | End: 2020-01-31 | Stop reason: HOSPADM

## 2020-01-27 RX ORDER — EPHEDRINE SULFATE/0.9% NACL/PF 50 MG/5 ML
SYRINGE (ML) INTRAVENOUS AS NEEDED
Status: DISCONTINUED | OUTPATIENT
Start: 2020-01-27 | End: 2020-01-27 | Stop reason: HOSPADM

## 2020-01-27 RX ORDER — VANCOMYCIN HYDROCHLORIDE 1 G/20ML
INJECTION, POWDER, LYOPHILIZED, FOR SOLUTION INTRAVENOUS AS NEEDED
Status: DISCONTINUED | OUTPATIENT
Start: 2020-01-27 | End: 2020-01-27 | Stop reason: HOSPADM

## 2020-01-27 RX ORDER — LOSARTAN POTASSIUM 50 MG/1
50 TABLET ORAL
Status: DISCONTINUED | OUTPATIENT
Start: 2020-01-27 | End: 2020-01-31 | Stop reason: HOSPADM

## 2020-01-27 RX ORDER — FACIAL-BODY WIPES
10 EACH TOPICAL DAILY PRN
Status: DISCONTINUED | OUTPATIENT
Start: 2020-01-29 | End: 2020-01-31 | Stop reason: HOSPADM

## 2020-01-27 RX ORDER — LIOTHYRONINE SODIUM 5 UG/1
5 TABLET ORAL
Status: DISCONTINUED | OUTPATIENT
Start: 2020-01-28 | End: 2020-01-27

## 2020-01-27 RX ORDER — HYDROMORPHONE HYDROCHLORIDE 1 MG/ML
.25-1 INJECTION, SOLUTION INTRAMUSCULAR; INTRAVENOUS; SUBCUTANEOUS
Status: DISCONTINUED | OUTPATIENT
Start: 2020-01-27 | End: 2020-01-27 | Stop reason: HOSPADM

## 2020-01-27 RX ORDER — SUCCINYLCHOLINE CHLORIDE 20 MG/ML
INJECTION INTRAMUSCULAR; INTRAVENOUS AS NEEDED
Status: DISCONTINUED | OUTPATIENT
Start: 2020-01-27 | End: 2020-01-27 | Stop reason: HOSPADM

## 2020-01-27 RX ORDER — ROCURONIUM BROMIDE 10 MG/ML
INJECTION, SOLUTION INTRAVENOUS AS NEEDED
Status: DISCONTINUED | OUTPATIENT
Start: 2020-01-27 | End: 2020-01-27 | Stop reason: HOSPADM

## 2020-01-27 RX ORDER — NALOXONE HYDROCHLORIDE 0.4 MG/ML
0.2 INJECTION, SOLUTION INTRAMUSCULAR; INTRAVENOUS; SUBCUTANEOUS
Status: DISCONTINUED | OUTPATIENT
Start: 2020-01-27 | End: 2020-01-27 | Stop reason: HOSPADM

## 2020-01-27 RX ORDER — SODIUM CHLORIDE 0.9 % (FLUSH) 0.9 %
5-40 SYRINGE (ML) INJECTION AS NEEDED
Status: DISCONTINUED | OUTPATIENT
Start: 2020-01-27 | End: 2020-01-31 | Stop reason: HOSPADM

## 2020-01-27 RX ORDER — PROPOFOL 10 MG/ML
INJECTION, EMULSION INTRAVENOUS AS NEEDED
Status: DISCONTINUED | OUTPATIENT
Start: 2020-01-27 | End: 2020-01-27 | Stop reason: HOSPADM

## 2020-01-27 RX ORDER — OXYCODONE HYDROCHLORIDE 5 MG/1
5 TABLET ORAL
Status: DISCONTINUED | OUTPATIENT
Start: 2020-01-27 | End: 2020-01-31 | Stop reason: HOSPADM

## 2020-01-27 RX ORDER — LIDOCAINE HYDROCHLORIDE 20 MG/ML
INJECTION, SOLUTION EPIDURAL; INFILTRATION; INTRACAUDAL; PERINEURAL AS NEEDED
Status: DISCONTINUED | OUTPATIENT
Start: 2020-01-27 | End: 2020-01-27 | Stop reason: HOSPADM

## 2020-01-27 RX ORDER — HYDROMORPHONE HYDROCHLORIDE 1 MG/ML
0.5 INJECTION, SOLUTION INTRAMUSCULAR; INTRAVENOUS; SUBCUTANEOUS
Status: ACTIVE | OUTPATIENT
Start: 2020-01-27 | End: 2020-01-28

## 2020-01-27 RX ORDER — OXYCODONE HYDROCHLORIDE 5 MG/1
10 TABLET ORAL
Status: DISCONTINUED | OUTPATIENT
Start: 2020-01-27 | End: 2020-01-31 | Stop reason: HOSPADM

## 2020-01-27 RX ORDER — DIPHENHYDRAMINE HYDROCHLORIDE 50 MG/ML
12.5 INJECTION, SOLUTION INTRAMUSCULAR; INTRAVENOUS
Status: DISCONTINUED | OUTPATIENT
Start: 2020-01-27 | End: 2020-01-31 | Stop reason: HOSPADM

## 2020-01-27 RX ORDER — HYDROMORPHONE HCL/0.9% NACL/PF 0.5 MG/ML
PLASTIC BAG, INJECTION (ML) INTRAVENOUS
Status: DISCONTINUED | OUTPATIENT
Start: 2020-01-27 | End: 2020-01-28

## 2020-01-27 RX ORDER — LEVOTHYROXINE SODIUM 112 UG/1
112 TABLET ORAL
Status: DISCONTINUED | OUTPATIENT
Start: 2020-01-28 | End: 2020-01-31 | Stop reason: HOSPADM

## 2020-01-27 RX ORDER — METOPROLOL TARTRATE 25 MG/1
12.5 TABLET, FILM COATED ORAL
Status: DISCONTINUED | OUTPATIENT
Start: 2020-01-27 | End: 2020-01-31 | Stop reason: HOSPADM

## 2020-01-27 RX ORDER — FENTANYL CITRATE 50 UG/ML
INJECTION, SOLUTION INTRAMUSCULAR; INTRAVENOUS AS NEEDED
Status: DISCONTINUED | OUTPATIENT
Start: 2020-01-27 | End: 2020-01-27 | Stop reason: HOSPADM

## 2020-01-27 RX ORDER — ONDANSETRON 2 MG/ML
INJECTION INTRAMUSCULAR; INTRAVENOUS AS NEEDED
Status: DISCONTINUED | OUTPATIENT
Start: 2020-01-27 | End: 2020-01-27 | Stop reason: HOSPADM

## 2020-01-27 RX ADMIN — FENTANYL CITRATE 100 MCG: 50 INJECTION INTRAMUSCULAR; INTRAVENOUS at 12:05

## 2020-01-27 RX ADMIN — PROPOFOL 30 MG: 10 INJECTION, EMULSION INTRAVENOUS at 12:06

## 2020-01-27 RX ADMIN — Medication 10 ML: at 22:00

## 2020-01-27 RX ADMIN — SODIUM CHLORIDE 100 MCG: 9 INJECTION INTRAMUSCULAR; INTRAVENOUS; SUBCUTANEOUS at 14:22

## 2020-01-27 RX ADMIN — LOSARTAN POTASSIUM 50 MG: 50 TABLET, FILM COATED ORAL at 20:17

## 2020-01-27 RX ADMIN — SENNOSIDES AND DOCUSATE SODIUM 1 TABLET: 8.6; 5 TABLET ORAL at 20:17

## 2020-01-27 RX ADMIN — SODIUM CHLORIDE, SODIUM LACTATE, POTASSIUM CHLORIDE, AND CALCIUM CHLORIDE 125 ML/HR: 600; 310; 30; 20 INJECTION, SOLUTION INTRAVENOUS at 10:39

## 2020-01-27 RX ADMIN — SODIUM CHLORIDE, SODIUM LACTATE, POTASSIUM CHLORIDE, AND CALCIUM CHLORIDE: 600; 310; 30; 20 INJECTION, SOLUTION INTRAVENOUS at 15:28

## 2020-01-27 RX ADMIN — LIDOCAINE HYDROCHLORIDE 50 MG: 20 INJECTION, SOLUTION INTRAVENOUS at 12:05

## 2020-01-27 RX ADMIN — WATER 2 G: 1 INJECTION INTRAMUSCULAR; INTRAVENOUS; SUBCUTANEOUS at 12:19

## 2020-01-27 RX ADMIN — MIDAZOLAM 2 MG: 1 INJECTION INTRAMUSCULAR; INTRAVENOUS at 11:57

## 2020-01-27 RX ADMIN — FENTANYL CITRATE 25 MCG: 50 INJECTION INTRAMUSCULAR; INTRAVENOUS at 14:05

## 2020-01-27 RX ADMIN — WATER 2 G: 1 INJECTION INTRAMUSCULAR; INTRAVENOUS; SUBCUTANEOUS at 21:49

## 2020-01-27 RX ADMIN — HYDROMORPHONE HYDROCHLORIDE 0.5 MG: 1 INJECTION, SOLUTION INTRAMUSCULAR; INTRAVENOUS; SUBCUTANEOUS at 17:13

## 2020-01-27 RX ADMIN — SODIUM CHLORIDE 100 MCG: 9 INJECTION INTRAMUSCULAR; INTRAVENOUS; SUBCUTANEOUS at 13:52

## 2020-01-27 RX ADMIN — ROCURONIUM BROMIDE 20 MG: 50 INJECTION, SOLUTION INTRAVENOUS at 12:44

## 2020-01-27 RX ADMIN — SODIUM CHLORIDE 100 MCG: 9 INJECTION INTRAMUSCULAR; INTRAVENOUS; SUBCUTANEOUS at 14:38

## 2020-01-27 RX ADMIN — Medication: at 16:44

## 2020-01-27 RX ADMIN — PROPOFOL 100 MCG/KG/MIN: 10 INJECTION, EMULSION INTRAVENOUS at 12:10

## 2020-01-27 RX ADMIN — SODIUM CHLORIDE 100 MCG: 9 INJECTION INTRAMUSCULAR; INTRAVENOUS; SUBCUTANEOUS at 13:21

## 2020-01-27 RX ADMIN — ROCURONIUM BROMIDE 5 MG: 50 INJECTION, SOLUTION INTRAVENOUS at 12:05

## 2020-01-27 RX ADMIN — SODIUM CHLORIDE 100 MCG: 9 INJECTION INTRAMUSCULAR; INTRAVENOUS; SUBCUTANEOUS at 15:02

## 2020-01-27 RX ADMIN — SODIUM CHLORIDE 100 MCG: 9 INJECTION INTRAMUSCULAR; INTRAVENOUS; SUBCUTANEOUS at 13:41

## 2020-01-27 RX ADMIN — FENTANYL CITRATE 50 MCG: 50 INJECTION INTRAMUSCULAR; INTRAVENOUS at 11:57

## 2020-01-27 RX ADMIN — HYDROMORPHONE HYDROCHLORIDE 0.5 MG: 2 INJECTION INTRAMUSCULAR; INTRAVENOUS; SUBCUTANEOUS at 14:28

## 2020-01-27 RX ADMIN — SODIUM CHLORIDE 100 MCG: 9 INJECTION INTRAMUSCULAR; INTRAVENOUS; SUBCUTANEOUS at 14:01

## 2020-01-27 RX ADMIN — SODIUM CHLORIDE 100 MCG: 9 INJECTION INTRAMUSCULAR; INTRAVENOUS; SUBCUTANEOUS at 12:32

## 2020-01-27 RX ADMIN — LABETALOL 20 MG/4 ML (5 MG/ML) INTRAVENOUS SYRINGE 10 MG: at 18:09

## 2020-01-27 RX ADMIN — FENTANYL CITRATE 25 MCG: 50 INJECTION INTRAMUSCULAR; INTRAVENOUS at 12:44

## 2020-01-27 RX ADMIN — SODIUM CHLORIDE 100 MCG: 9 INJECTION INTRAMUSCULAR; INTRAVENOUS; SUBCUTANEOUS at 13:16

## 2020-01-27 RX ADMIN — SODIUM CHLORIDE 100 MCG: 9 INJECTION INTRAMUSCULAR; INTRAVENOUS; SUBCUTANEOUS at 13:06

## 2020-01-27 RX ADMIN — SODIUM CHLORIDE 100 MCG: 9 INJECTION INTRAMUSCULAR; INTRAVENOUS; SUBCUTANEOUS at 12:43

## 2020-01-27 RX ADMIN — PROPOFOL 150 MG: 10 INJECTION, EMULSION INTRAVENOUS at 12:05

## 2020-01-27 RX ADMIN — HYDROMORPHONE HYDROCHLORIDE 0.5 MG: 2 INJECTION INTRAMUSCULAR; INTRAVENOUS; SUBCUTANEOUS at 15:46

## 2020-01-27 RX ADMIN — Medication 15 MG: at 12:20

## 2020-01-27 RX ADMIN — DEXAMETHASONE SODIUM PHOSPHATE 8 MG: 4 INJECTION, SOLUTION INTRAMUSCULAR; INTRAVENOUS at 12:19

## 2020-01-27 RX ADMIN — PREGABALIN 75 MG: 75 CAPSULE ORAL at 20:17

## 2020-01-27 RX ADMIN — SODIUM CHLORIDE 125 ML/HR: 900 INJECTION, SOLUTION INTRAVENOUS at 16:43

## 2020-01-27 RX ADMIN — SUCCINYLCHOLINE CHLORIDE 100 MG: 20 INJECTION, SOLUTION INTRAMUSCULAR; INTRAVENOUS; PARENTERAL at 12:05

## 2020-01-27 RX ADMIN — FENTANYL CITRATE 50 MCG: 50 INJECTION INTRAMUSCULAR; INTRAVENOUS at 12:29

## 2020-01-27 RX ADMIN — ONDANSETRON 4 MG: 2 INJECTION INTRAMUSCULAR; INTRAVENOUS at 12:17

## 2020-01-27 RX ADMIN — ATORVASTATIN CALCIUM 20 MG: 20 TABLET, FILM COATED ORAL at 20:17

## 2020-01-27 NOTE — PERIOP NOTES
TRANSFER - OUT REPORT:    Verbal report given to [address] [facility] RN(name) on Lettie Essex  being transferred to [facility](unit) for routine post - op       Report consisted of patients Situation, Background, Assessment and   Recommendations(SBAR). Information from the following report(s) SBAR, Kardex, Intake/Output, MAR, Recent Results and Cardiac Rhythm SINUS TACH/NSR was reviewed with the receiving nurse. Lines:   Peripheral IV 01/27/20 Left Wrist (Active)   Site Assessment Clean, dry, & intact 1/27/2020  6:00 PM   Phlebitis Assessment 0 1/27/2020  6:00 PM   Infiltration Assessment 0 1/27/2020  6:00 PM   Dressing Status Clean, dry, & intact 1/27/2020  6:00 PM   Dressing Type Transparent 1/27/2020  6:00 PM   Hub Color/Line Status Pink 1/27/2020  6:00 PM   Action Taken Open ports on tubing capped 1/27/2020  6:00 PM   Alcohol Cap Used Yes 1/27/2020  6:00 PM      DUAL  Nurse skin check done. Opportunity for questions and clarification was provided.       Patient transported with:   O2 @ 2 liters  Registered Nurse

## 2020-01-27 NOTE — PERIOP NOTES
Dr. Agustín Saldaña informed of pt -112 / Elevated BP.  150-170 Syst.  Orders received. Labetalol  10 mg IVP given per orders.

## 2020-01-27 NOTE — OP NOTES
[address] [facility]  [address], [address] [facility]    OPERATIVE REPORT      NAME: Raeann Yip    AGE: 75 y.o. YOB: 1945    MEDICAL RECORD NUMBER: [id number]    DATE OF SURGERY: 1/27/2020    PREOPERATIVE DIAGNOSES:  1. Lumbar stenosis  2. Acquired lumbar spondylolisthesis    POSTOPERATIVE DIAGNOSES:  1. Lumbar stenosis   2. Acquired lumbar spondylolisthesis     OPERATIVE PROCEDURES:   1. Laminectomy, partial facetectomy, and foraminotomy of L2, L3, L4, and L5.  2. Posterolateral fusion and posterior lumbar interbody fusion of L2-L3. 3. Posterolateral fusion and posterior lumbar interbody fusion of L3-L4. 4. Posterolateral fusion and posterior lumbar interbody fusion of L4-L5. 5. Pedicle screw instrumentation with RTI pedicle screws for L2, L3, L4, and L5 bilaterally. 6. Application of biomechanical interbody device at L4-L5, L3-L4, and L2-L3.  7. Morselized allograft for spine surgery and local autograft for spine surgery with stem cells. SURGEON: Jett Lua MD     ASSISTANT: VALENTIN Brand    ANESTHESIA: General    Specimens - none    Implants - see above    COMPLICATIONS: None apparent     NEUROMONITORING: We used SSEPs and spontaneous EMGs. We also stimulated the pedicle screws. ESTIMATED BLOOD LOSS: 350 cc    INDICATION FOR PROCEDURE: The patient is a very pleasant 75 y.o. female with debilitating back pain and leg pain. She failed conservative measures. She elected to proceed with operative intervention. She was aware of the risks, benefits, and alternatives. She provided informed consent. PROCEDURE: The patient was identified in the preoperative holding area. Her lumbar spine was marked by me. She was transferred to the operating room where general anesthesia was given. She was also given perioperative antibiotics. She was placed prone on the [facility] table. All bony prominences were well padded.  The lumbar spine was prepped and draped in the usual standard fashion. We performed a surgical timeout. I made a skin incision in the midline. I exposed the posterior lumbar spine. I took intraoperative fluoroscopy to verify our levels. I exposed the transverse processes of L2, L3, L4, and L5 bilaterally. I then began my decompression by performing a laminectomy of L2, L3, L4, and L5. I also performed a partial facetectomy and foraminotomy to decompress the thecal sac and the roots of L2, L3, L4, and L5 bilaterally. I performed a transforaminal approach on the right side with exposure of the right L4-L5 disk space by widening our partial facetectomy and foraminotomy at L4-L5. I performed an identical transforaminal approach on the right side with exposure of the right L3-L4 and L2-L3 disk spaces. I then performed a standard diskectomy at L4-L5, L3-L4, and L2-L3. I prepared the endplates to bleeding bone. I performed trial sizing. I placed the appropriately sized biomechanical device into L4-L5, L3-L4, and L2-L3 with the appropriate amount of tension and alignment. The biomechanical devices were packed with autograft and morselized allograft. I then cannulated our pedicles for L2, L3, L4, and L5 bilaterally in standard fashion. I probed each pedicle. I copiously irrigated the entire wound. I decorticated our fusion beds bilaterally with a high speed bur. I placed our bone graft into our fusion beds bilaterally. I then placed pedicle screws for L2, L3, L4, and L5 bilaterally in standard fashion under fluoroscopic guidance. I had good purchase for each pedicle screw. I stimulated all the screws with pedicle screw stimulation. The pedicle screws were found to be within the appropriate range of amplitude. I then placed contoured rods on top of the pedicle screws bilaterally. The rods were locked to the screws according to the 's specification. We had good hemostasis. There was no CSF leaking.  The fusion beds were packed with morselized allograft and local autograft. The exposed neurologic elements were protected with Duragen. I injected the soft tissues with a pain management cocktail. A deep drain was placed. The wound was closed in several layers. A sterile dressing was applied. The patient was extubated and transferred to the recovery room in good medical condition. The PA assisted with retraction and wound closure    I, Dr. Adria Fermin, performed the above procedure.      Adria Fermin MD  1/27/2020

## 2020-01-27 NOTE — PERIOP NOTES
FLOSEAL 30mL WAS GIVEN TO THE STERILE FIELD TO BE USED BY MD   REF: [id number]   LOT: [id number]   EXP: 9-

## 2020-01-27 NOTE — ANESTHESIA POSTPROCEDURE EVALUATION
Procedure(s):  L2-L5 LAMINECTOMY, FUSION, INSTRUMENTATION, TLIF, BONE GRAFT. general    Anesthesia Post Evaluation      Multimodal analgesia: multimodal analgesia not used between 6 hours prior to anesthesia start to PACU discharge  Patient location during evaluation: PACU  Patient participation: complete - patient participated  Level of consciousness: awake  Pain management: adequate  Airway patency: patent  Anesthetic complications: no  Cardiovascular status: acceptable, blood pressure returned to baseline and hemodynamically stable  Respiratory status: acceptable  Hydration status: acceptable  Post anesthesia nausea and vomiting:  controlled      Vitals Value Taken Time   /64 1/27/2020  4:55 PM   Temp 36.6 °C (97.9 °F) 1/27/2020  4:27 PM   Pulse 103 1/27/2020  4:57 PM   Resp 9 1/27/2020  4:57 PM   SpO2 100 % 1/27/2020  4:57 PM   Vitals shown include unvalidated device data.

## 2020-01-27 NOTE — H&P
Date of Surgery Update:  Lydia Yañez was seen and examined. History and physical has been reviewed. The patient has been examined.  There have been no significant clinical changes since the completion of the originally dated History and Physical.    Signed By: Parvin Tate MD     January 27, 2020 10:26 AM

## 2020-01-27 NOTE — ANESTHESIA PREPROCEDURE EVALUATION
Anesthetic History     Increased risk of difficult airway and PONV          Review of Systems / Medical History  Patient summary reviewed, nursing notes reviewed and pertinent labs reviewed    Pulmonary        Sleep apnea (central sleep apnea-dental appliance)           Neuro/Psych   Within defined limits           Cardiovascular    Hypertension (white coat syndrome)          Hyperlipidemia    Exercise tolerance: >4 METS     GI/Hepatic/Renal  Within defined limits   GERD: well controlled           Endo/Other      Hypothyroidism: well controlled  Arthritis and cancer (thyroid and melanoma)     Other Findings              Physical Exam    Airway  Mallampati: II  TM Distance: 4 - 6 cm  Neck ROM: normal range of motion   Mouth opening: Normal     Cardiovascular    Rhythm: regular  Rate: normal         Dental    Dentition: Upper dentition intact, Lower dentition intact and Implants  Comments: Implant-back tooth   Pulmonary  Breath sounds clear to auscultation               Abdominal         Other Findings            Anesthetic Plan    ASA: 2  Anesthesia type: general            Anesthetic plan and risks discussed with: Patient

## 2020-01-28 LAB
ANION GAP SERPL CALC-SCNC: 3 MMOL/L (ref 5–15)
BUN SERPL-MCNC: 16 MG/DL (ref 6–20)
BUN/CREAT SERPL: 26 (ref 12–20)
CALCIUM SERPL-MCNC: 7.7 MG/DL (ref 8.5–10.1)
CHLORIDE SERPL-SCNC: 112 MMOL/L (ref 97–108)
CO2 SERPL-SCNC: 28 MMOL/L (ref 21–32)
CREAT SERPL-MCNC: 0.62 MG/DL (ref 0.55–1.02)
GLUCOSE SERPL-MCNC: 130 MG/DL (ref 65–100)
HGB BLD-MCNC: 10.6 G/DL (ref 11.5–16)
HGB BLD-MCNC: NORMAL G/DL (ref 11.5–16)
POTASSIUM SERPL-SCNC: 5 MMOL/L (ref 3.5–5.1)
SODIUM SERPL-SCNC: 143 MMOL/L (ref 136–145)

## 2020-01-28 PROCEDURE — 97161 PT EVAL LOW COMPLEX 20 MIN: CPT

## 2020-01-28 PROCEDURE — 77010033678 HC OXYGEN DAILY

## 2020-01-28 PROCEDURE — 94760 N-INVAS EAR/PLS OXIMETRY 1: CPT

## 2020-01-28 PROCEDURE — 74011250636 HC RX REV CODE- 250/636: Performed by: ORTHOPAEDIC SURGERY

## 2020-01-28 PROCEDURE — 80048 BASIC METABOLIC PNL TOTAL CA: CPT

## 2020-01-28 PROCEDURE — 65270000029 HC RM PRIVATE

## 2020-01-28 PROCEDURE — 74011250637 HC RX REV CODE- 250/637: Performed by: ORTHOPAEDIC SURGERY

## 2020-01-28 PROCEDURE — 97110 THERAPEUTIC EXERCISES: CPT

## 2020-01-28 PROCEDURE — 36415 COLL VENOUS BLD VENIPUNCTURE: CPT

## 2020-01-28 PROCEDURE — 85018 HEMOGLOBIN: CPT

## 2020-01-28 PROCEDURE — 97116 GAIT TRAINING THERAPY: CPT

## 2020-01-28 PROCEDURE — 97760 ORTHOTIC MGMT&TRAING 1ST ENC: CPT

## 2020-01-28 PROCEDURE — 74011000250 HC RX REV CODE- 250: Performed by: ORTHOPAEDIC SURGERY

## 2020-01-28 RX ADMIN — ATORVASTATIN CALCIUM 20 MG: 20 TABLET, FILM COATED ORAL at 18:43

## 2020-01-28 RX ADMIN — OXYCODONE 10 MG: 5 TABLET ORAL at 09:18

## 2020-01-28 RX ADMIN — PREGABALIN 75 MG: 75 CAPSULE ORAL at 18:43

## 2020-01-28 RX ADMIN — PANTOPRAZOLE SODIUM 40 MG: 40 TABLET, DELAYED RELEASE ORAL at 05:53

## 2020-01-28 RX ADMIN — SODIUM CHLORIDE 125 ML/HR: 900 INJECTION, SOLUTION INTRAVENOUS at 09:21

## 2020-01-28 RX ADMIN — LOSARTAN POTASSIUM 50 MG: 50 TABLET, FILM COATED ORAL at 21:51

## 2020-01-28 RX ADMIN — OXYCODONE 10 MG: 5 TABLET ORAL at 13:04

## 2020-01-28 RX ADMIN — Medication 10 ML: at 06:00

## 2020-01-28 RX ADMIN — LIOTHYRONINE SODIUM 5 MCG: 5 TABLET ORAL at 05:53

## 2020-01-28 RX ADMIN — SODIUM CHLORIDE 125 ML/HR: 900 INJECTION, SOLUTION INTRAVENOUS at 01:15

## 2020-01-28 RX ADMIN — WATER 2 G: 1 INJECTION INTRAMUSCULAR; INTRAVENOUS; SUBCUTANEOUS at 04:33

## 2020-01-28 RX ADMIN — SENNOSIDES AND DOCUSATE SODIUM 1 TABLET: 8.6; 5 TABLET ORAL at 09:16

## 2020-01-28 RX ADMIN — Medication 10 ML: at 22:00

## 2020-01-28 RX ADMIN — PREGABALIN 75 MG: 75 CAPSULE ORAL at 09:15

## 2020-01-28 RX ADMIN — CHOLECALCIFEROL TAB 125 MCG (5000 UNIT) 5000 UNITS: 125 TAB at 09:15

## 2020-01-28 RX ADMIN — WATER 2 G: 1 INJECTION INTRAMUSCULAR; INTRAVENOUS; SUBCUTANEOUS at 12:46

## 2020-01-28 RX ADMIN — SENNOSIDES AND DOCUSATE SODIUM 1 TABLET: 8.6; 5 TABLET ORAL at 18:43

## 2020-01-28 RX ADMIN — OXYCODONE 10 MG: 5 TABLET ORAL at 21:51

## 2020-01-28 RX ADMIN — Medication 10 ML: at 09:17

## 2020-01-28 RX ADMIN — POLYETHYLENE GLYCOL 3350 17 G: 17 POWDER, FOR SOLUTION ORAL at 09:15

## 2020-01-28 NOTE — PROGRESS NOTES
Problem: Mobility Impaired (Adult and Pediatric)  Goal: *Acute Goals and Plan of Care (Insert Text)  Description  FUNCTIONAL STATUS PRIOR TO ADMISSION: Patient was independent and active without use of DME.    HOME SUPPORT PRIOR TO ADMISSION: The patient lived with spouse but did not require assist.    Physical Therapy Goals  Initiated 1/28/2020    1. Patient will move from supine to sit and sit to supine , scoot up and down and roll side to side in bed with independence within 4 days. 2. Patient will perform sit to stand with modified independence within 4 days. 3. Patient will ambulate with modified independence for 200 feet with the least restrictive device within 4 days. 4. Patient will ascend/descend 4 stairs with  handrail(s) with modified independence within 4 days. 5. Patient will verbalize and demonstrate understanding of spinal precautions (No bending, lifting greater than 5 lbs, or twisting; log-roll technique; frequent repositioning as instructed) within 4 days. Outcome: Progressing Towards Goal   PHYSICAL THERAPY EVALUATION  Patient: Bear Perez (18 y.o. female)  Date: 1/28/2020  Primary Diagnosis: Spondylolisthesis of lumbar region [M43.16]  Pseudoclaudication syndrome [M48.062]  Lumbar stenosis with neurogenic claudication [M48.062]  Procedure(s) (LRB):  L2-L5 LAMINECTOMY, FUSION, INSTRUMENTATION, TLIF, BONE GRAFT (N/A) 1 Day Post-Op   Precautions: Log roll, Back brace when OOB, may be up to bathroom without the back brace. ASSESSMENT  Based on the objective data described below, the patient presents with difficulty with ambulation. Rolled on the edge of bed min assist, sit to stand CGA, ambulate with rolling walker CGA out on the [address] [place] [address] hallway, no loss of balance steady sitting and standing. Patient has a aspen LSO back brace that she never use form previous back problems, communicated with Alysha Mendez NP and stated patient ok to use the back brace form home.  Reviewed back precautions and donning of back brace verbalized understanding. Spouse in the room and agreed with all goals set for the patient. OOB to chair as tolerated performed some active range of motion exercise on both LE all planes. Communicated with nurse who agreed to monitor patient. Current Level of Function Impacting Discharge (mobility/balance): CGA with ambulation with rolling walker during ambulation. Functional Outcome Measure: The patient scored 70/100 on the barthel index outcome measure. Other factors to consider for discharge: fall     Patient will benefit from skilled therapy intervention to address the above noted impairments. PLAN :  Recommendations and Planned Interventions: bed mobility training, transfer training, gait training, therapeutic exercises, patient and family training/education and therapeutic activities      Frequency/Duration: Patient will be followed by physical therapy:  twice daily to address goals. Recommendation for discharge: (in order for the patient to meet his/her long term goals)  Physical therapy at least 2 days/week in the home     This discharge recommendation:  Has been made in collaboration with the attending provider and/or case management    IF patient discharges home will need the following DME: patient owns DME required for discharge         SUBJECTIVE:   Patient stated [address]     OBJECTIVE DATA SUMMARY:   HISTORY:    Past Medical History:   Diagnosis Date    Allergic rhinitis     Arthritis     Osteo    Central sleep apnea     uses a mouth piece    Chronic pain     arthritis pain    Difficult intubation 2011    during [facility] 2011- [facility] in [facility] care reported no complications      GERD (gastroesophageal reflux disease)     Hx of echocardiogram 01/28/2019    Normal- [facility]    Hyperlipidemia     Hypertension     Well controlled with medications- see BP list in chart    Melanoma ([address] 2012 & 2015    x 3    PONV (postoperative nausea and vomiting)     Prediabetes 01/21/2020    5.9    Thyroid cancer ([address] 2013    White coat syndrome with hypertension 2000    her BP will increase to 200's/100's- Cardio and PCP aware, runs normal at home     Past Surgical History:   Procedure Laterality Date    HX CHOLECYSTECTOMY  2006    HX DILATION AND CURETTAGE      over 40 years ago    HX HIP REPLACEMENT Right 2001    HX HYSTERECTOMY  2012    HX KNEE REPLACEMENT Left 2015    HX MALIGNANT SKIN LESION EXCISION  2012,2015,2017    HX MENISCUS REPAIR Left 2015    HX SHOULDER REPLACEMENT Bilateral 2011 & 2015    HX THYROIDECTOMY  2013       Personal factors and/or comorbidities impacting plan of care:     Home Situation  Home Environment: Private residence  # Steps to Enter: 4  Rails to Enter: Yes  Hand Rails : Bilateral  One/Two Story Residence: Two story, live on 1st floor  Living Alone: No  Support Systems: Spouse/Significant Other/Partner  Patient Expects to be Discharged to[de-identified] Private residence  Current DME Used/Available at Home: Walker, rolling, Grab bars, Raised toilet seat(seat in shower)  Tub or Shower Type: Shower    EXAMINATION/PRESENTATION/DECISION MAKING:   Critical Behavior:  Neurologic State: Alert  Orientation Level: Oriented X4  Cognition: Appropriate decision making  Safety/Judgement: Awareness of environment  Hearing:       Range Of Motion:  AROM: Within functional limits                       Strength:    Strength:  Within functional limits                    Tone & Sensation:   Tone: Normal              Sensation: Intact               Coordination:  Coordination: Within functional limits  Vision:      Functional Mobility:  Bed Mobility:  Rolling: Minimum assistance  Supine to Sit: Minimum assistance  Sit to Supine: Minimum assistance  Scooting: Modified independent  Transfers:  Sit to Stand: Contact guard assistance  Stand to Sit: Contact guard assistance  Stand Pivot Transfers: Contact guard assistance     Bed to Chair: Contact guard assistance              Balance:   Sitting: Intact  Standing: Intact; With support  Ambulation/Gait Training:  Distance (ft): 60 Feet (ft)  Assistive Device: Walker, rolling;Gait belt;Brace/Splint  Ambulation - Level of Assistance: Contact guard assistance     Gait Description (WDL): Exceptions to WDL  Gait Abnormalities: Path deviations        Base of Support: Narrowed     Speed/Colleen: Pace decreased (<100 feet/min)  Step Length: Right shortened;Left shortened                    Therapeutic Exercises:    Instructed patient to continue active range of motion exercise on both legs while up on chair or on bed. Functional Measure:  Barthel Index:    Bathin  Bladder: 10  Bowels: 10  Groomin  Dressin  Feeding: 10  Mobility: 10  Stairs: 0  Toilet Use: 10  Transfer (Bed to Chair and Back): 10  Total: 70/100       The Barthel ADL Index: Guidelines  1. The index should be used as a record of what a patient does, not as a record of what a patient could do. 2. The main aim is to establish degree of independence from any help, physical or verbal, however minor and for whatever reason. 3. The need for supervision renders the patient not independent. 4. A patient's performance should be established using the best available evidence. Asking the patient, friends/relatives and nurses are the usual sources, but direct observation and common sense are also important. However direct testing is not needed. 5. Usually the patient's performance over the preceding 24-48 hours is important, but occasionally longer periods will be relevant. 6. Middle categories imply that the patient supplies over 50 per cent of the effort. 7. Use of aids to be independent is allowed. Jessee Bautista., Barthel, D.W. (2253). Functional evaluation: the Barthel Index. [facility] (14)2. ANTOINE Mcbride, Brittani Kam., Eland Blaise., [place], [address] ().  Measuring the change indisability after inpatient rehabilitation; comparison of the responsiveness of the Barthel Index and Functional [place] Measure. Journal of Neurology, Neurosurgery, and Psychiatry, 66(4), 437-334. Onel Dempsey, N.J.A, GT Jara, & Teri Jeff M.A. (2004.) Assessment of post-stroke quality of life in cost-effectiveness studies: The usefulness of the Barthel Index and the EuroQoL-5D. Quality of Life Research, 15, 596-72          Physical Therapy Evaluation Charge Determination   History Examination Presentation Decision-Making   MEDIUM  Complexity : 1-2 comorbidities / personal factors will impact the outcome/ POC  LOW Complexity : 1-2 Standardized tests and measures addressing body structure, function, activity limitation and / or participation in recreation  LOW Complexity : Stable, uncomplicated  Other outcome measures barthel  LOW       Based on the above components, the patient evaluation is determined to be of the following complexity level: LOW     Pain Ratin/10    Activity Tolerance:   Good  Please refer to the flowsheet for vital signs taken during this treatment. After treatment patient left in no apparent distress:   Sitting in chair, Heels elevated for pressure relief, Call bell within reach, Bed / chair alarm activated and Caregiver / family present    COMMUNICATION/EDUCATION:   The patients plan of care was discussed with: Occupational Therapist and Registered Nurse. Fall prevention education was provided and the patient/caregiver indicated understanding.     Thank you for this referral.  Abimbola Harmon PT,WCC   Time Calculation: 45 mins

## 2020-01-28 NOTE — PROGRESS NOTES
Per patient, she does not want urinary catheter removed until PT works with her on ambulating to the bathroom.

## 2020-01-28 NOTE — PROGRESS NOTES
BP elevated in pacu and pt received Labetalol (see mar). Page sent to MD on call Cherelle Rutledge. PT takes Losartan at night. Notified night nurse at shift change regarding the request for Losartan. Orders received to resume Losartan home dose and PRN Metoprolol (see mar). OK to give Losartan now.

## 2020-01-28 NOTE — ROUTINE PROCESS
Verbal and Written shift change report given to [address] (oncoming nurse) by Naz Landa (offgoing nurse). Report included the following information SBAR, Kardex and MAR.

## 2020-01-28 NOTE — PROGRESS NOTES
1/28/2020  9:33 AM  Reason for Admission:   Elective - Spondylolisthesis                   RUR Score:          10           Plan for utilizing home health:      [place] recommended. Pt preference indicated as [facility]. CM completed referral, and they accepted. Current Advanced Directive/Advance Care Plan: Not on file. Pt defers to . Transition of Care Plan:                      CM met with pt for assessment. Demographics and PCP were confirmed. Pt is a 75year old,  female who lives in a private residence with her  (4 exterior steps, 1 flight interior but bedroom is on 1st floor). PTA, pt was able to complete ADLs without the use of DME. Pt has a RW to assist during recovery. Pt is retired, and [facility] through [employer] [facility] Pt's  to transport. CM will continue to monitor for finalized discharge recommendations. Julio C Woodson MA    Care Management Interventions  PCP Verified by CM: Yes(Demetra)  Mode of Transport at Discharge:  Other (see comment)(Family)  Transition of Care Consult (CM Consult): [address]: No  Reason [place]: Patient already serviced by other home care/hospice agency(Pt preference)  MyChart Signup: No  Discharge Durable Medical Equipment: No  Physical Therapy Consult: Yes  Occupational Therapy Consult: Yes  Speech Therapy Consult: No  Current Support Network: Lives with Spouse  Confirm Follow Up Transport: Family  The Plan for Transition of Care is Related to the Following Treatment Goals : Spondylolisthesis  The Patient and/or Patient Representative was Provided with a Choice of Provider and Agrees with the Discharge Plan?: (S) Yes  Name of the Patient Representative Who was Provided with a Choice of Provider and Agrees with the Discharge Plan: Self  Freedom of Choice List was Provided with Basic Dialogue that Supports the Patient's Individualized Plan of Care/Goals, Treatment Preferences and Shares the Quality Data Associated with the Providers?: (S) Yes  Discharge Location  Discharge Placement: Home with home health

## 2020-01-28 NOTE — PERIOP NOTES
[address] [facility] RN informed repeat /68,   HR 92 after Labetalol dose given. Informed also Losartan po was not taken by pt  this am.  No further questions per RN.

## 2020-01-28 NOTE — PROGRESS NOTES
Problem: Mobility Impaired (Adult and Pediatric)  Goal: *Acute Goals and Plan of Care (Insert Text)  Description  FUNCTIONAL STATUS PRIOR TO ADMISSION: Patient was independent and active without use of DME.    HOME SUPPORT PRIOR TO ADMISSION: The patient lived with spouse but did not require assist.    Physical Therapy Goals  Initiated 1/28/2020    1. Patient will move from supine to sit and sit to supine , scoot up and down and roll side to side in bed with independence within 4 days. 2. Patient will perform sit to stand with modified independence within 4 days. 3. Patient will ambulate with modified independence for 200 feet with the least restrictive device within 4 days. 4. Patient will ascend/descend 4 stairs with  handrail(s) with modified independence within 4 days. 5. Patient will verbalize and demonstrate understanding of spinal precautions (No bending, lifting greater than 5 lbs, or twisting; log-roll technique; frequent repositioning as instructed) within 4 days. 1/28/2020 1729 by Nereida Talley, PT  Outcome: Progressing Towards Goal  1/28/2020 1302 by Nereida Talley, PT  Outcome: Progressing Towards Goal   PHYSICAL THERAPY TREATMENT  Patient: Pietro Ibanez (89 y.o. female)  Date: 1/28/2020  Diagnosis: Spondylolisthesis of lumbar region [M43.16]  Pseudoclaudication syndrome [M48.062]  Lumbar stenosis with neurogenic claudication [M48.062]   <principal problem not specified>  Procedure(s) (LRB):  L2-L5 LAMINECTOMY, FUSION, INSTRUMENTATION, TLIF, BONE GRAFT (N/A) 1 Day Post-Op  Precautions:   No bending, no lifting greater than 5 lbs, no twisting, log-roll technique, repositioning every 20-30 min except when sleeping, brace when OOB (if ordered)  Chart, physical therapy assessment, plan of care and goals were reviewed. ASSESSMENT  Patient continues with skilled PT services and is progressing towards goals.  Ambulate with rolling walker out on the 4th floor hallway SBA no loss of balance, steady sitting and standing. Reviewed back precautions and donning of back brace verbalized understanding. OOB to chair as tolerated, performed some active range of motion exercise on both LE all planes. Communicated with nurse who agreed to monitor patient. Current Level of Function Impacting Discharge (mobility/balance): SBA ambulation using a rolling walker    Other factors to consider for discharge: none         PLAN :  Patient continues to benefit from skilled intervention to address the above impairments. Continue treatment per established plan of care. to address goals. Recommendation for discharge: (in order for the patient to meet his/her long term goals)  Physical therapy at least 2 days/week in the home     This discharge recommendation:  Has been made in collaboration with the attending provider and/or case management    IF patient discharges home will need the following DME: patient owns DME required for discharge       SUBJECTIVE:   Patient stated [address]     OBJECTIVE DATA SUMMARY:   Critical Behavior:  Neurologic State: Alert  Orientation Level: Oriented X4  Cognition: Appropriate decision making  Safety/Judgement: Awareness of environment    Spinal diagnosis intervention:  The patient stated 3/3 back precautions when prompted. Reviewed all 3 back precautions, log roll technique, and sitting for 30 minutes at a time. Functional Mobility Training:    Bed Mobility:  Log Rolling: Contact guard assistance  Supine to Sit: Contact guard assistance  Sit to Supine: Contact guard assistance  Scooting: Contact guard assistance        Transfers:  Sit to Stand: Contact guard assistance  Stand to Sit: Contact guard assistance  Stand Pivot Transfers: Contact guard assistance     Bed to Chair: Contact guard assistance                    Balance:  Sitting: Intact  Standing: Intact; With support  Ambulation/Gait Training:  Distance (ft): 200 Feet (ft)  Assistive Device: Walker, rolling;Gait belt;Brace/Splint  Ambulation - Level of Assistance: Stand-by assistance     Gait Description (WDL): Exceptions to WDL  Gait Abnormalities: Path deviations        Base of Support: Narrowed     Speed/Colleen: Pace decreased (<100 feet/min)  Step Length: Right shortened;Left shortened                         Therapeutic Exercises:    Instructed patient to continue active range of motion exercise on both legs while up on chair or on bed. Pain Ratin/10    Activity Tolerance:   Good  Please refer to the flowsheet for vital signs taken during this treatment. After treatment patient left in no apparent distress:   Sitting in chair, Heels elevated for pressure relief, Call bell within reach, and Bed / chair alarm activated    COMMUNICATION/COLLABORATION:   The patients plan of care was discussed with: Registered Nurse    Michael Aponte, PT,WCC.    Time Calculation: 24 mins

## 2020-01-28 NOTE — PROGRESS NOTES
Problem: Self Care Deficits Care Plan (Adult)  Goal: *Acute Goals and Plan of Care (Insert Text)  Description  FUNCTIONAL STATUS PRIOR TO ADMISSION: Patient was independent and active without use of DME.    HOME SUPPORT: The patient lived with spouse but did not require assist.    Occupational Therapy Goals  Initiated 1/28/2020    1. Patient will perform lower body dressing with modified independence using Reacher, Stocking Aid and Long [employer] PRN within 7 days. 2.  Patient will perform toilet transfer with modified independence using most appropriate DME within 7 days. 3.  Patient will bathing at supervision/set-up within 7 days. 4.  Patient will don/doff back brace at modified independence within 7 days. 5.  Patient will verbalize/demonstrate 3/3 back precautions during ADL tasks without cues within 7 days. Outcome: Progressing Towards Goal   OCCUPATIONAL THERAPY EVALUATION  Patient: Uzma Sanz (74 y.o. female)  Date: 1/28/2020  Primary Diagnosis: Spondylolisthesis of lumbar region [M43.16]  Pseudoclaudication syndrome [M48.062]  Lumbar stenosis with neurogenic claudication [M48.062]  Procedure(s) (LRB):  L2-L5 LAMINECTOMY, FUSION, INSTRUMENTATION, TLIF, BONE GRAFT (N/A) 1 Day Post-Op   Precautions: Back, Falls       ASSESSMENT  Based on the objective data described below, the patient presents with back precautions, decrease mobility and decrease independence with LB self-care.  present in the room. Reviewed back precautions. Both verbalized understanding. Pt has all AE/DME from her previous hip/knee/shoulder surgeries. Feel pt would benefit from 1-2 OT visits to address listed goals to ensure safety at discharge. Current Level of Function Impacting Discharge (ADLs/self-care: mod to max assist with LB    Functional Outcome Measure: The patient scored 70/100 on the Barthel outcome measure which is indicative of impairment.       Other factors to consider for discharge: none noted     Patient will benefit from skilled therapy intervention to address the above noted impairments. PLAN :  Recommendations and Planned Interventions: self care training, functional mobility training, balance training, therapeutic activities, patient education, home safety training, and family training/education    Frequency/Duration: Patient will be followed by occupational therapy 5 times a week to address goals. Recommendation for discharge: (in order for the patient to meet his/her long term goals)  No skilled occupational therapy/ follow up rehabilitation needs identified at this time. This discharge recommendation:  A follow-up discussion with the attending provider and/or case management is planned    IF patient discharges home will need the following DME: none       SUBJECTIVE:   Patient stated I've had lots of surgeries.     OBJECTIVE DATA SUMMARY:   HISTORY:   Past Medical History:   Diagnosis Date    Allergic rhinitis     Arthritis     Osteo    Central sleep apnea     uses a mouth piece    Chronic pain     arthritis pain    Difficult intubation 2011    during total surgery at [facility] 2011- anesthesia note in [facility] reported no complications      GERD (gastroesophageal reflux disease)     Hx of echocardiogram 01/28/2019    Normal- [facility]    Hyperlipidemia     Hypertension     Well controlled with medications- see BP list in chart    Melanoma ([facility] 2012 & 2015    x 3    PONV (postoperative nausea and vomiting)     Prediabetes 01/21/2020    5.9    Thyroid cancer ([facility] 2013    White coat syndrome with hypertension 2000    her BP will increase to 200's/100's- Cardio and PCP aware, runs normal at home     Past Surgical History:   Procedure Laterality Date    HX CHOLECYSTECTOMY  2006    HX DILATION AND CURETTAGE      over 40 years ago    HX HIP REPLACEMENT Right 2001    HX HYSTERECTOMY  2012    HX KNEE REPLACEMENT Left 2015    HX MALIGNANT SKIN LESION EXCISION  2012,2015,2017    HX MENISCUS REPAIR Left 2015    HX SHOULDER REPLACEMENT Bilateral 2011 & 2015    HX THYROIDECTOMY  2013       Expanded or extensive additional review of patient history:     Home Situation  Home Environment: Private residence  # Steps to Enter: 4  Rails to Enter: Yes  Hand Rails : Bilateral  One/Two Story Residence: Two story, live on 1st floor  Living Alone: No  Support Systems: Spouse/Significant Other/Partner  Patient Expects to be Discharged to[de-identified] Private residence  Current DME Used/Available at Home: Walker, rolling, Grab bars, Raised toilet seat(seat in shower)  Tub or Shower Type: Shower        EXAMINATION OF PERFORMANCE DEFICITS:  Cognitive/Behavioral Status:  Neurologic State: Alert  Orientation Level: Oriented X4  Cognition: Appropriate decision making        Safety/Judgement: Awareness of environment    Skin: intact    Edema: none noted    Hearing:       Vision/Perceptual:                                     Range of Motion:    AROM: Within functional limits                         Strength:    Strength: Within functional limits                Coordination:  Coordination: Within functional limits  Fine Motor Skills-Upper: Left Intact; Right Intact    Gross Motor Skills-Upper: Left Intact; Right Intact    Tone & Sensation:    Tone: Normal  Sensation: Intact                      Balance:  Sitting: Intact  Standing: Intact; With support    Functional Mobility and Transfers for ADLs:  Bed Mobility:  Supine to Sit: Minimum assistance  Scooting: Modified independent    Transfers:  Sit to Stand: Contact guard assistance  Stand to Sit: Contact guard assistance  Bed to Chair: Contact guard assistance  Bathroom Mobility: Contact guard assistance  Toilet Transfer : Stand-by assistance    ADL Assessment:  Feeding: Independent    Oral Facial Hygiene/Grooming: Independent    Bathing:  Moderate assistance    Upper Body Dressing: Minimum assistance    Lower Body Dressing: Maximum assistance    Toileting: Stand by assistance ADL Intervention and task modifications:            Cognitive Retraining  Safety/Judgement: Awareness of environment       Functional Measure:  Barthel Index:    Bathin  Bladder: 10  Bowels: 10  Groomin  Dressin  Feeding: 10  Mobility: 10  Stairs: 0  Toilet Use: 10  Transfer (Bed to Chair and Back): 10  Total: 70/100        The Barthel ADL Index: Guidelines  1. The index should be used as a record of what a patient does, not as a record of what a patient could do. 2. The main aim is to establish degree of independence from any help, physical or verbal, however minor and for whatever reason. 3. The need for supervision renders the patient not independent. 4. A patient's performance should be established using the best available evidence. Asking the patient, friends/relatives and nurses are the usual sources, but direct observation and common sense are also important. However direct testing is not needed. 5. Usually the patient's performance over the preceding 24-48 hours is important, but occasionally longer periods will be relevant. 6. Middle categories imply that the patient supplies over 50 per cent of the effort. 7. Use of aids to be independent is allowed. Jenni Rojas., Barthel, D.W. (1885). Functional evaluation: the Barthel Index. [facility] [address] [facility] (14)2. Lyn Fang jaymie ANTOINE Izquierdo, Que Barger, Raffi Evans., [place], [address] [place] [address] (). Measuring the change indisability after inpatient rehabilitation; comparison of the responsiveness of the Barthel Index and Functional Randolph Measure. Journal of Neurology, Neurosurgery, and Psychiatry, 66(4), 830-976. Perla Mendez, N.LEIGH.SONIA, GT Jara, & Malathi Roe MBerkleyA. (2004.) Assessment of post-stroke quality of life in cost-effectiveness studies: The usefulness of the Barthel Index and the EuroQoL-5D.  Quality of Life Research, 15, 368-10        Occupational Therapy Evaluation Charge Determination   History Examination Decision-Making   LOW Complexity : Brief history review  LOW Complexity : 1-3 performance deficits relating to physical, cognitive , or psychosocial skils that result in activity limitations and / or participation restrictions  LOW Complexity : No comorbidities that affect functional and no verbal or physical assistance needed to complete eval tasks       Based on the above components, the patient evaluation is determined to be of the following complexity level: LOW   Pain Rating:  No c/o pain    Activity Tolerance:   Good  Please refer to the flowsheet for vital signs taken during this treatment. After treatment patient left in no apparent distress:    Sitting in chair, Call bell within reach, Bed / chair alarm activated, and Caregiver / family present    COMMUNICATION/EDUCATION:   The patients plan of care was discussed with: Physical Therapist and Registered Nurse. Home safety education was provided and the patient/caregiver indicated understanding., Patient/family have participated as able in goal setting and plan of care. , and Patient/family agree to work toward stated goals and plan of care. This patients plan of care is appropriate for delegation to [facility].     Thank you for this referral.  Osvaldo Carrasco OTR/L

## 2020-01-28 NOTE — PROGRESS NOTES
Spiritual Care Partner Volunteer visited patient in [facility] unit on 1/28/20. Documented by:  [employer] Elisabeth Arenas.      Paging Service: [phone])

## 2020-01-28 NOTE — PROGRESS NOTES
ORTHOPAEDIC LUMBAR FUSION PROGRESS NOTE    NAME:     Bear Perez   :       1945   MRN:       [id number]   DATE:      2020    POD:    1 Day Post-Op  S/P:    Procedure(s):  L2-L5 LAMINECTOMY, FUSION, INSTRUMENTATION, TLIF, BONE GRAFT    SUBJECTIVE:    C/O back pain along surgical incision  No leg pain or numbness  Denies nausea/vomiting, headache, chest pain or shortness of breath  Pain controlled    Recent Labs     20  0537   HGB 10.6*      K 5.0   *   CO2 28   BUN 16   CREA 0.62   *     Patient Vitals for the past 12 hrs:   BP Temp Pulse Resp SpO2   20 0732 156/74 97.8 °F (36.6 °C) 80 16 100 %   20 0348 145/71 98.3 °F (36.8 °C) 89 15 97 %   20 2330 152/75 98.1 °F (36.7 °C) 91 14 96 %       EXAM:  Dressings clean, dry and intact   Positive strength/ROM bilat lower ext.   Neuro intact to sensation  Calves, soft & nontender  BL LEs NVID      PLAN:  D/C PCA, change to PO pain medications  PT/OT, OOB w/ assist  Advance diet as tolerated      Gaurav Ramirez [place]  Orthopaedic Surgery  Physician Assistant to Dr. Cierra Au

## 2020-01-29 LAB
ANION GAP SERPL CALC-SCNC: 4 MMOL/L (ref 5–15)
BUN SERPL-MCNC: 16 MG/DL (ref 6–20)
BUN/CREAT SERPL: 30 (ref 12–20)
CALCIUM SERPL-MCNC: 7.6 MG/DL (ref 8.5–10.1)
CHLORIDE SERPL-SCNC: 111 MMOL/L (ref 97–108)
CO2 SERPL-SCNC: 27 MMOL/L (ref 21–32)
CREAT SERPL-MCNC: 0.53 MG/DL (ref 0.55–1.02)
GLUCOSE SERPL-MCNC: 116 MG/DL (ref 65–100)
HGB BLD-MCNC: 9.6 G/DL (ref 11.5–16)
POTASSIUM SERPL-SCNC: 3.6 MMOL/L (ref 3.5–5.1)
SODIUM SERPL-SCNC: 142 MMOL/L (ref 136–145)

## 2020-01-29 PROCEDURE — 65270000029 HC RM PRIVATE

## 2020-01-29 PROCEDURE — 97535 SELF CARE MNGMENT TRAINING: CPT

## 2020-01-29 PROCEDURE — 36415 COLL VENOUS BLD VENIPUNCTURE: CPT

## 2020-01-29 PROCEDURE — 97530 THERAPEUTIC ACTIVITIES: CPT

## 2020-01-29 PROCEDURE — 85018 HEMOGLOBIN: CPT

## 2020-01-29 PROCEDURE — 97116 GAIT TRAINING THERAPY: CPT

## 2020-01-29 PROCEDURE — 94760 N-INVAS EAR/PLS OXIMETRY 1: CPT

## 2020-01-29 PROCEDURE — 74011250637 HC RX REV CODE- 250/637: Performed by: ORTHOPAEDIC SURGERY

## 2020-01-29 PROCEDURE — 80048 BASIC METABOLIC PNL TOTAL CA: CPT

## 2020-01-29 RX ADMIN — OXYCODONE 10 MG: 5 TABLET ORAL at 01:02

## 2020-01-29 RX ADMIN — ACETAMINOPHEN 650 MG: 325 TABLET ORAL at 13:25

## 2020-01-29 RX ADMIN — CHOLECALCIFEROL TAB 125 MCG (5000 UNIT) 5000 UNITS: 125 TAB at 08:56

## 2020-01-29 RX ADMIN — ACETAMINOPHEN 650 MG: 325 TABLET ORAL at 18:32

## 2020-01-29 RX ADMIN — PREGABALIN 75 MG: 75 CAPSULE ORAL at 08:55

## 2020-01-29 RX ADMIN — Medication 5 ML: at 22:00

## 2020-01-29 RX ADMIN — OXYCODONE 10 MG: 5 TABLET ORAL at 08:56

## 2020-01-29 RX ADMIN — OXYCODONE 10 MG: 5 TABLET ORAL at 13:26

## 2020-01-29 RX ADMIN — PREGABALIN 75 MG: 75 CAPSULE ORAL at 18:33

## 2020-01-29 RX ADMIN — LEVOTHYROXINE SODIUM 112 MCG: 112 TABLET ORAL at 07:30

## 2020-01-29 RX ADMIN — SENNOSIDES AND DOCUSATE SODIUM 1 TABLET: 8.6; 5 TABLET ORAL at 18:33

## 2020-01-29 RX ADMIN — OXYCODONE 10 MG: 5 TABLET ORAL at 05:27

## 2020-01-29 RX ADMIN — PANTOPRAZOLE SODIUM 40 MG: 40 TABLET, DELAYED RELEASE ORAL at 07:01

## 2020-01-29 RX ADMIN — OXYCODONE 10 MG: 5 TABLET ORAL at 18:31

## 2020-01-29 RX ADMIN — POLYETHYLENE GLYCOL 3350 17 G: 17 POWDER, FOR SOLUTION ORAL at 08:59

## 2020-01-29 RX ADMIN — LIOTHYRONINE SODIUM 5 MCG: 5 TABLET ORAL at 07:01

## 2020-01-29 RX ADMIN — Medication 10 ML: at 13:28

## 2020-01-29 RX ADMIN — SENNOSIDES AND DOCUSATE SODIUM 1 TABLET: 8.6; 5 TABLET ORAL at 08:59

## 2020-01-29 RX ADMIN — LOSARTAN POTASSIUM 50 MG: 50 TABLET, FILM COATED ORAL at 21:50

## 2020-01-29 RX ADMIN — OXYCODONE 10 MG: 5 TABLET ORAL at 22:00

## 2020-01-29 RX ADMIN — ATORVASTATIN CALCIUM 20 MG: 20 TABLET, FILM COATED ORAL at 18:32

## 2020-01-29 RX ADMIN — Medication 10 ML: at 06:00

## 2020-01-29 NOTE — PROGRESS NOTES
Problem: Mobility Impaired (Adult and Pediatric)  Goal: *Acute Goals and Plan of Care (Insert Text)  Description  FUNCTIONAL STATUS PRIOR TO ADMISSION: Patient was independent and active without use of DME.    HOME SUPPORT PRIOR TO ADMISSION: The patient lived with spouse but did not require assist.    Physical Therapy Goals  Initiated 1/28/2020    1. Patient will move from supine to sit and sit to supine , scoot up and down and roll side to side in bed with independence within 4 days. 2. Patient will perform sit to stand with modified independence within 4 days. 3. Patient will ambulate with modified independence for 200 feet with the least restrictive device within 4 days. 4. Patient will ascend/descend 4 stairs with  handrail(s) with modified independence within 4 days. 5. Patient will verbalize and demonstrate understanding of spinal precautions (No bending, lifting greater than 5 lbs, or twisting; log-roll technique; frequent repositioning as instructed) within 4 days. 1/29/2020 1542 by Anjana Govea, PT  Outcome: Progressing Towards Goal  1/29/2020 1044 by Bonnie White, PT  Outcome: Progressing Towards Goal   PHYSICAL THERAPY TREATMENT  Patient: Jermain Castañeda (89 y.o. female)  Date: 1/29/2020  Diagnosis: Spondylolisthesis of lumbar region [M43.16]  Pseudoclaudication syndrome [M48.062]  Lumbar stenosis with neurogenic claudication [M48.062]   <principal problem not specified>  Procedure(s) (LRB):  L2-L5 LAMINECTOMY, FUSION, INSTRUMENTATION, TLIF, BONE GRAFT (N/A) 2 Days Post-Op  Precautions:   No bending, no lifting greater than 5 lbs, no twisting, log-roll technique, repositioning every 20-30 min except when sleeping, brace when OOB (if ordered)  Chart, physical therapy assessment, plan of care and goals were reviewed. ASSESSMENT  Patient continues with skilled PT services and is progressing towards goals. Ambulate with rolling walker out on the 4th floor hallway decreased pace.  Steady sitting and standing balance, assisted to and from the bathroom. Reviewed back precautions and donning of back brace verbalized understanding. Initiated family training with the spouse verbalized understanding and agreed with all instruction and goals set for the patient. OOB to chair as tolerated performed some active range of motion exercise on both LE all planes. Communicated with nurse who agreed to monitor patient. Current Level of Function Impacting Discharge (mobility/balance): SBA with transfers and ambulation using a rolling walker    Other factors to consider for discharge: none         PLAN :  Patient continues to benefit from skilled intervention to address the above impairments. Continue treatment per established plan of care. to address goals. Recommendation for discharge: (in order for the patient to meet his/her long term goals)  Physical therapy at least 2 days/week in the home     This discharge recommendation:  Has been made in collaboration with the attending provider and/or case management    IF patient discharges home will need the following DME: patient owns DME required for discharge       SUBJECTIVE:   Patient stated Sam Gordon.     OBJECTIVE DATA SUMMARY:   Critical Behavior:  Neurologic State: Alert  Orientation Level: Oriented X4  Cognition: Follows commands  Safety/Judgement: Awareness of environment, Fall prevention, Good awareness of safety precautions    Spinal diagnosis intervention:  The patient stated 3/3 back precautions when prompted. Reviewed all 3 back precautions, log roll technique, and sitting for 30 minutes at a time.     Functional Mobility Training:    Bed Mobility:  Log Rolling: Contact guard assistance  Supine to Sit: Contact guard assistance  Sit to Supine: Contact guard assistance  Scooting: Contact guard assistance        Transfers:  Sit to Stand: Stand-by assistance  Stand to Sit: Stand-by assistance  Stand Pivot Transfers: Stand-by assistance     Bed to Chair: Stand-by assistance                    Balance:  Sitting: Intact  Standing: Intact; With support  Ambulation/Gait Training:  Distance (ft): 300 Feet (ft)  Assistive Device: Walker, rolling;Gait belt  Ambulation - Level of Assistance: Stand-by assistance     Gait Description (WDL): Exceptions to WDL  Gait Abnormalities: Path deviations              Speed/Colleen: Pace decreased (<100 feet/min)                               Therapeutic Exercises:    Instructed patient to continue active range of motion exercise on both legs while up on chair or on bed. Pain Ratin/10    Activity Tolerance:   Good  Please refer to the flowsheet for vital signs taken during this treatment. After treatment patient left in no apparent distress:   Sitting in chair, Heels elevated for pressure relief, Call bell within reach, Bed / chair alarm activated, and Caregiver / family present    COMMUNICATION/COLLABORATION:   The patients plan of care was discussed with: Registered Nurse    Kimberly Palafox, PT,WCC.    Time Calculation: 24 mins

## 2020-01-29 NOTE — PROGRESS NOTES
Problem: Mobility Impaired (Adult and Pediatric)  Goal: *Acute Goals and Plan of Care (Insert Text)  Description  FUNCTIONAL STATUS PRIOR TO ADMISSION: Patient was independent and active without use of DME.    HOME SUPPORT PRIOR TO ADMISSION: The patient lived with spouse but did not require assist.    Physical Therapy Goals  Initiated 1/28/2020    1. Patient will move from supine to sit and sit to supine , scoot up and down and roll side to side in bed with independence within 4 days. 2. Patient will perform sit to stand with modified independence within 4 days. 3. Patient will ambulate with modified independence for 200 feet with the least restrictive device within 4 days. 4. Patient will ascend/descend 4 stairs with  handrail(s) with modified independence within 4 days. 5. Patient will verbalize and demonstrate understanding of spinal precautions (No bending, lifting greater than 5 lbs, or twisting; log-roll technique; frequent repositioning as instructed) within 4 days. Outcome: Progressing Towards Goal   PHYSICAL THERAPY TREATMENT  Patient: Sam Blancas (43 y.o. female)  Date: 1/29/2020  Diagnosis: Spondylolisthesis of lumbar region [M43.16]  Pseudoclaudication syndrome [M48.062]  Lumbar stenosis with neurogenic claudication [M48.062]   <principal problem not specified>  Procedure(s) (LRB):  L2-L5 LAMINECTOMY, FUSION, INSTRUMENTATION, TLIF, BONE GRAFT (N/A) 2 Days Post-Op  Precautions:   No bending, no lifting greater than 5 lbs, no twisting, log-roll technique, repositioning every 20-30 min except when sleeping, brace when OOB (if ordered)  Chart, physical therapy assessment, plan of care and goals were reviewed. ASSESSMENT  Patient continues with skilled PT services and is progressing towards goals. Ambulate with rolling walker out on the 4th floor hallway SBA no loss of balance steady sitting and standing balance decreased pace.  Reviewed back precautions and donning of back brace verbalized understanding. Spouse in the room and agreed with all goals set for the patient. OOB to chair as tolerated, performed some active range of motion exercise on both LE all planes. Communicated with nurse who agreed to monitor patient     Current Level of Function Impacting Discharge (mobility/balance): SBA with ambulation using a rolling walker and transfers    Other factors to consider for discharge: none         PLAN :  Patient continues to benefit from skilled intervention to address the above impairments. Continue treatment per established plan of care. to address goals. Recommendation for discharge: (in order for the patient to meet his/her long term goals)  Physical therapy at least 2 days/week in the home     This discharge recommendation:  Has been made in collaboration with the attending provider and/or case management    IF patient discharges home will need the following DME: patient owns DME required for discharge       SUBJECTIVE:   Patient stated Pattie James for my walk.     OBJECTIVE DATA SUMMARY:   Critical Behavior:  Neurologic State: Alert  Orientation Level: Oriented X4  Cognition: Appropriate decision making, Follows commands  Safety/Judgement: Awareness of environment    Spinal diagnosis intervention:  The patient stated 3/3 back precautions when prompted. Reviewed all 3 back precautions, log roll technique, and sitting for 30 minutes at a time. Functional Mobility Training:    Bed Mobility:  Log Rolling: (already up on the chair)                 Transfers:  Sit to Stand: Stand-by assistance  Stand to Sit: Stand-by assistance  Stand Pivot Transfers: Stand-by assistance     Bed to Chair: Stand-by assistance                    Balance:  Sitting: Intact  Standing: Intact; With support  Ambulation/Gait Training:  Distance (ft): 300 Feet (ft)  Assistive Device: Walker, rolling;Gait belt;Brace/Splint  Ambulation - Level of Assistance: Stand-by assistance     Gait Description (WDL): Exceptions to WDL  Gait Abnormalities: Path deviations              Speed/Colleen: Pace decreased (<100 feet/min)                     Therapeutic Exercises:    Instructed patient to continue active range of motion exercise on both legs while up on chair or on bed. Pain Ratin/10    Activity Tolerance:   Good  Please refer to the flowsheet for vital signs taken during this treatment. After treatment patient left in no apparent distress:   Sitting in chair, Heels elevated for pressure relief, Call bell within reach, and Caregiver / family present    COMMUNICATION/COLLABORATION:   The patients plan of care was discussed with: Occupational Therapist and Registered Nurse    Dee Crowley, PT,WCC.    Time Calculation: 24 mins

## 2020-01-29 NOTE — PROGRESS NOTES
1/29/2020  4:22 PM  CM reviewed EMR for coordination of dispo needs. Pt is progressing with PT/OT. Awaiting medical stability for discharge. CM to continue to monitor. Transition of care plan:  1. Medical clearance  2. Home with [facility]  3. Outpatient follow-up.   4. Pt's family to transport

## 2020-01-29 NOTE — ROUTINE PROCESS
Verbal and Written shift change report given to [phone] (oncoming nurse) by Naz Landa (offgoing nurse).  Report included the following information SB[facility], Kardex and [facility]

## 2020-01-29 NOTE — PROGRESS NOTES
Problem: Self Care Deficits Care Plan (Adult)  Goal: *Acute Goals and Plan of Care (Insert Text)  Description  FUNCTIONAL STATUS PRIOR TO ADMISSION: Patient was independent and active without use of DME.    HOME SUPPORT: The patient lived with spouse but did not require assist.    Occupational Therapy Goals  Initiated 1/28/2020    1. Patient will perform lower body dressing with modified independence using Reacher, Stocking Aid and Long [employer] PRN within 7 days. 2.  Patient will perform toilet transfer with modified independence using most appropriate DME within 7 days. 3.  Patient will bathing at supervision/set-up within 7 days. 4.  Patient will don/doff back brace at modified independence within 7 days. 5.  Patient will verbalize/demonstrate 3/3 back precautions during ADL tasks without cues within 7 days. Outcome: Progressing Towards Goal   OCCUPATIONAL THERAPY TREATMENT  Patient: Loco Alberto (69 y.o. female)  Date: 1/29/2020  Diagnosis: Spondylolisthesis of lumbar region [M43.16]  Pseudoclaudication syndrome [M48.062]  Lumbar stenosis with neurogenic claudication [M48.062]   <principal problem not specified>  Procedure(s) (LRB):  L2-L5 LAMINECTOMY, FUSION, INSTRUMENTATION, TLIF, BONE GRAFT (N/A) 2 Days Post-Op  Precautions:    Chart, occupational therapy assessment, plan of care, and goals were reviewed. ASSESSMENT  Patient continues with skilled OT services and is progressing towards goals. Pt received seated in chair. Participated in education on use of LB AE to assist with dressing, reports  will also be able to assist. Performed CHG bath and pt donned gown and LSO brace with setup. Using RW pt ambulated to bathroom, performed toilet transfer and standing grooming ADL with SBA, no LOB observed. Returned to sitting in chair at end of session. Demonstrated good understanding and adherence to back precautions throughout.  No c/o pain, only stiffness from sitting in chair, reviewed sitting restrictions. Pt is progressing well with therapy. Current Level of Function Impacting Discharge (ADLs): Up to min A LB ADLs, setup-independent UB ADLs, SBA transfers    Other factors to consider for discharge: Pt lives with  who will be able to assist as needed         PLAN :  Patient continues to benefit from skilled intervention to address the above impairments. Continue treatment per established plan of care. to address goals. Recommend with staff: OOB to chair for all meals    Recommendation for discharge: (in order for the patient to meet his/her long term goals)  No skilled occupational therapy/ follow up rehabilitation needs identified at this time. This discharge recommendation:  Has been made in collaboration with the attending provider and/or case management    IF patient discharges home will need the following DME: pt has necessary equipment for OT       SUBJECTIVE:   Patient stated My  is so good about helping me.     OBJECTIVE DATA SUMMARY:   Cognitive/Behavioral Status:  Neurologic State: Alert  Orientation Level: Oriented X4  Cognition: Follows commands  Perception: Appears intact  Perseveration: No perseveration noted  Safety/Judgement: Awareness of environment; Fall prevention;Good awareness of safety precautions    Functional Mobility and Transfers for ADLs:  Bed Mobility:  Rolling: (already up on the chair)    Transfers:  Sit to Stand: Stand-by assistance  Functional Transfers  Toilet Transfer : Stand-by assistance; Adaptive equipment(RW)  Bed to Chair: Stand-by assistance    Balance:  Sitting: Intact  Standing: Intact; With support    ADL Intervention:       Grooming  Washing Hands: Stand-by assistance(standing at sink)              Upper Body Dressing Assistance  Orthotics(Brace): Set-up    Lower Body Dressing Assistance  Underpants: Compensatory technique training  Socks: Compensatory technique training    Toileting  Bladder Hygiene: Modified independent    Cognitive Retraining  Safety/Judgement: Awareness of environment; Fall prevention;Good awareness of safety precautions    The patient recalled and demonstrated 3/3 back precautions. Reviewed all 3 with patient. Bathing: Patient instructed and indicated understanding when bathing to not submerge wound in water, stand to shower or sponge bathe, cover wound with plastic and tape to ensure no water reaches bandage/wound without cues. Dressing brace: Patient instructed and demonstrated to don/doff velcro on brace using dominant side, keeping non-dominant side intact. Patient instructed and demonstrated in meantime of being able to stand with back against wall to don/doff brace, to don/doff seated using lap and bed/chair surface to support brace while manipulating. Dressing lower body: Patient instructed to don brace first and on the benefits to remain seated to don all clothing to increase independence with precautions and pain management. Toileting: Patient instructed on the benefits of using flushable wet wipes and toilet tongs if decreased reach or pain for mele care. Also, the benefits of a reacher to aid in clothing management. Home safety: Patient instructed and indicated understanding on home modifications and safety (raise height of ADL objects, appropriate height of chair surfaces, recliner safety, change of floor surfaces, clear pathways) to increase independence and fall prevention with SBA. Standing: Patient instructed and indicated understanding to walk up to sink/counter top/surfaces, step into walker, square off while using objects, slide objects along surfaces, to increase adherence to back precautions and fall prevention. Patient instructed to increase amount of time standing in order to increase independence and tolerance with ADLs. During prolonged standing, can open cabinet door or place foot on stool to decrease spinal pressure/increase pain.    Tub transfer: Patient instructed and indicated understanding regarding when it is safe to begin transfer into tub (complete stairs with PT, advance exercises with PT high enough to clear tub height, and while clothes donned practice with another person present). Patient instructed and indicated understanding to use the same technique as used with stairs when entering and exiting tub (\"up with good leg, down with bad leg\"). Patient instructed and indicated understanding the benefits of maintaining activity tolerance, functional mobility, and independence with self care tasks during acute stay  to ensure safe return home and to baseline. Encouraged patient to increase frequency and duration OOB, not sitting longer than 30 mins without marching/walking with staff, be out of bed for all meals, perform daily ADLs (as approved by RN/MD regarding bathing etc), and performing functional mobility to/from bathroom. Patient instruction and indicated understanding on body mechanics, ergonomics and gravitational force on the spine during different body positions to plan activities in prep for return home to complete instrumental ADLs and back to work safely. Pain:  0/10- c/o stiffness after sitting in chair    Activity Tolerance:   Good  Please refer to the flowsheet for vital signs taken during this treatment.     After treatment patient left in no apparent distress:   Sitting in chair, Call bell within reach, and Caregiver / family present    COMMUNICATION/COLLABORATION:   The patients plan of care was discussed with: Physical Therapist and Registered Nurse    Saumya Mcgill OT  Time Calculation: 32 mins

## 2020-01-29 NOTE — PROGRESS NOTES
ORTHOPAEDIC LUMBAR FUSION PROGRESS NOTE    NAME:     Uzma Sanz   :       1945   MRN:       [id number]   DATE:      2020    POD:    2 Days Post-Op  S/P:    Procedure(s):  L2-L5 LAMINECTOMY, FUSION, INSTRUMENTATION, TLIF, BONE GRAFT    SUBJECTIVE:    C/O back pain along surgical incision  No leg pain or numbness  Denies nausea/vomiting, headache, chest pain or shortness of breath  Pain controlled    Recent Labs     20  0332   HGB 9.6*      K 3.6   *   CO2 27   BUN 16   CREA 0.53*   *     Patient Vitals for the past 12 hrs:   BP Temp Pulse Resp SpO2   20 0729 148/72 98.8 °F (37.1 °C) 91 14 98 %   20 0054 135/69 99.3 °F (37.4 °C) 92 16 97 %   20 143/71 98.7 °F (37.1 °C) 88 16 98 %       EXAM:  Dressings clean, dry and intact   Positive strength/ROM bilat lower ext.   Neuro intact to sensation  Calves, soft & nontender  BL LEs NVID      PLAN:  Continue prn PO pain medications  PT/OT, OOB w/ assist  Tolerating diet      Onetha Armin, [address]  Orthopaedic Surgery  Physician Assistant to Dr. Heaven Sabillon

## 2020-01-30 PROCEDURE — 65270000029 HC RM PRIVATE

## 2020-01-30 PROCEDURE — 94760 N-INVAS EAR/PLS OXIMETRY 1: CPT

## 2020-01-30 PROCEDURE — 97116 GAIT TRAINING THERAPY: CPT

## 2020-01-30 PROCEDURE — 97530 THERAPEUTIC ACTIVITIES: CPT

## 2020-01-30 PROCEDURE — 74011250637 HC RX REV CODE- 250/637: Performed by: ORTHOPAEDIC SURGERY

## 2020-01-30 RX ORDER — AMOXICILLIN 250 MG
1 CAPSULE ORAL 2 TIMES DAILY
Qty: 60 TAB | Refills: 0 | Status: SHIPPED | OUTPATIENT
Start: 2020-01-30

## 2020-01-30 RX ORDER — CYCLOBENZAPRINE HCL 10 MG
10 TABLET ORAL
Qty: 30 TAB | Refills: 0 | Status: SHIPPED | OUTPATIENT
Start: 2020-01-30 | End: 2020-05-06

## 2020-01-30 RX ORDER — OXYCODONE HYDROCHLORIDE 5 MG/1
5-10 TABLET ORAL
Qty: 50 TAB | Refills: 0 | Status: SHIPPED | OUTPATIENT
Start: 2020-01-30 | End: 2020-02-06

## 2020-01-30 RX ADMIN — POLYETHYLENE GLYCOL 3350 17 G: 17 POWDER, FOR SOLUTION ORAL at 08:38

## 2020-01-30 RX ADMIN — PREGABALIN 75 MG: 75 CAPSULE ORAL at 08:38

## 2020-01-30 RX ADMIN — PREGABALIN 75 MG: 75 CAPSULE ORAL at 18:00

## 2020-01-30 RX ADMIN — ATORVASTATIN CALCIUM 20 MG: 20 TABLET, FILM COATED ORAL at 18:36

## 2020-01-30 RX ADMIN — Medication 10 ML: at 22:00

## 2020-01-30 RX ADMIN — LIOTHYRONINE SODIUM 5 MCG: 5 TABLET ORAL at 06:16

## 2020-01-30 RX ADMIN — CHOLECALCIFEROL TAB 125 MCG (5000 UNIT) 5000 UNITS: 125 TAB at 08:38

## 2020-01-30 RX ADMIN — OXYCODONE 10 MG: 5 TABLET ORAL at 08:38

## 2020-01-30 RX ADMIN — OXYCODONE 10 MG: 5 TABLET ORAL at 11:55

## 2020-01-30 RX ADMIN — CYCLOBENZAPRINE 10 MG: 10 TABLET, FILM COATED ORAL at 08:37

## 2020-01-30 RX ADMIN — SENNOSIDES AND DOCUSATE SODIUM 1 TABLET: 8.6; 5 TABLET ORAL at 08:38

## 2020-01-30 RX ADMIN — OXYCODONE 10 MG: 5 TABLET ORAL at 22:01

## 2020-01-30 RX ADMIN — LOSARTAN POTASSIUM 50 MG: 50 TABLET, FILM COATED ORAL at 22:01

## 2020-01-30 RX ADMIN — Medication 10 ML: at 08:40

## 2020-01-30 RX ADMIN — PANTOPRAZOLE SODIUM 40 MG: 40 TABLET, DELAYED RELEASE ORAL at 06:16

## 2020-01-30 RX ADMIN — OXYCODONE 10 MG: 5 TABLET ORAL at 15:53

## 2020-01-30 RX ADMIN — LEVOTHYROXINE SODIUM 112 MCG: 112 TABLET ORAL at 06:30

## 2020-01-30 NOTE — PROGRESS NOTES
Occupational Therapy Note: Pt declined OT stating \" I have all the necessary equipment and know how to use it. \" No further questions/concerns for OT.

## 2020-01-30 NOTE — PROGRESS NOTES
ORTHOPAEDIC LUMBAR FUSION PROGRESS NOTE    NAME:     Vidal Franz   :       1945   MRN:       [id number]   DATE:      2020    POD:    3 Days Post-Op  S/P:    Procedure(s):  L2-L5 LAMINECTOMY, FUSION, INSTRUMENTATION, TLIF, BONE GRAFT    SUBJECTIVE:    C/O back pain along surgical incision  No leg pain or numbness  Denies nausea/vomiting, headache, chest pain or shortness of breath  Pain controlled    Recent Labs     20  0332   HGB 9.6*      K 3.6   *   CO2 27   BUN 16   CREA 0.53*   *     Patient Vitals for the past 12 hrs:   BP Temp Pulse Resp SpO2   20 0849 132/70 98.6 °F (37 °C) 98 16 99 %   20 0344 153/70 99.2 °F (37.3 °C) 88 18 94 %   20 2329 153/69 98.2 °F (36.8 °C) 86 18 99 %   20 2148 160/74           EXAM:  Dressings clean, dry and intact   Positive strength/ROM bilat lower ext.   Neuro intact to sensation  Calves, soft & nontender  BL LEs NVID      PLAN:  Monitor hemovac drain output  Continue prn PO pain medications  PT/OT, OOB w/ assist  Tolerating diet      Marry Muhammad [place]  Orthopaedic Surgery  Physician Assistant to Dr. Macarena Adrian

## 2020-01-30 NOTE — PROGRESS NOTES
1/30/2020  3:38 PM  CM reviewed EMR for coordination of dispo needs. Pt is progressing with PT/OT. Awaiting medical stability for discharge. CM to continue to monitor. Transition of care plan:  1. Medical clearance  2. Home with [facility]  3. Outpatient follow-up.   4. Pt's family to transport

## 2020-01-30 NOTE — PROGRESS NOTES
Problem: Mobility Impaired (Adult and Pediatric)  Goal: *Acute Goals and Plan of Care (Insert Text)  Description  FUNCTIONAL STATUS PRIOR TO ADMISSION: Patient was independent and active without use of DME.    HOME SUPPORT PRIOR TO ADMISSION: The patient lived with spouse but did not require assist.    Physical Therapy Goals  Initiated 1/28/2020    1. Patient will move from supine to sit and sit to supine , scoot up and down and roll side to side in bed with independence within 4 days. 2. Patient will perform sit to stand with modified independence within 4 days. 3. Patient will ambulate with modified independence for 200 feet with the least restrictive device within 4 days. 4. Patient will ascend/descend 4 stairs with  handrail(s) with modified independence within 4 days. 5. Patient will verbalize and demonstrate understanding of spinal precautions (No bending, lifting greater than 5 lbs, or twisting; log-roll technique; frequent repositioning as instructed) within 4 days. Note:   PHYSICAL THERAPY TREATMENT  Patient: Rhonda Conrad (34 y.o. female)  Date: 1/30/2020  Diagnosis: Spondylolisthesis of lumbar region [M43.16]  Pseudoclaudication syndrome [M48.062]  Lumbar stenosis with neurogenic claudication [M48.062]   <principal problem not specified>  Procedure(s) (LRB):  L2-L5 LAMINECTOMY, FUSION, INSTRUMENTATION, TLIF, BONE GRAFT (N/A) 3 Days Post-Op  Precautions:    Chart, physical therapy assessment, plan of care and goals were reviewed. ASSESSMENT  Patient continues with skilled PT services. Pt supine to sit with CGA. Pt CGA sit to stand. Pt donned back brace with min assist.Pt ambulated 200ft with RW CGA. Pt up and down 4 steps with rails  CGA. Pt CGA to min assist back to bed. Pt is progressing well with mobility. Pt is safe to mobilize  in and out of bed with  standing by. Nurse notified. Current Level of Function Impacting Discharge (mobility/balance): Pt CGA for ambulating with RW. PLAN :  Patient continues to benefit from skilled intervention to address the above impairments. Continue treatment per established plan of care. to address goals. Recommendation for discharge: (in order for the patient to meet his/her long term goals)  Physical therapy at least 2 days/week in the home     This discharge recommendation:  Has been made in collaboration with the attending provider and/or case management    IF patient discharges home will need the following DME: rolling walker       SUBJECTIVE:       OBJECTIVE DATA SUMMARY:   Critical Behavior:  Neurologic State: Alert  Orientation Level: Oriented X4  Cognition: Appropriate decision making, Follows commands  Safety/Judgement: Awareness of environment, Fall prevention, Good awareness of safety precautions  Functional Mobility Training:  Bed Mobility:     Supine to Sit: Contact guard assistance  Sit to Supine: Contact guard assistance;Minimum assistance           Transfers:  Sit to Stand: Contact guard assistance  Stand to Sit: Contact guard assistance                             Balance:  Sitting: Intact  Standing: Intact; With support  Ambulation/Gait Training:  Distance (ft): 200 Feet (ft)  Assistive Device: Gait belt;Walker, rolling  Ambulation - Level of Assistance: Contact guard assistance        Gait Abnormalities: Decreased step clearance        Base of Support: Narrowed     Speed/Colleen: Pace decreased (<100 feet/min)  Step Length: Right shortened;Left shortened                Activity Tolerance:   Good  Please refer to the flowsheet for vital signs taken during this treatment.     After treatment patient left in no apparent distress:   Sitting in chair    COMMUNICATION/COLLABORATION:   The patients plan of care was discussed with: Physical Therapist    Carlito Hanna PTA   Time Calculation: 23 mins

## 2020-01-30 NOTE — PROGRESS NOTES
Will keep patient overnight and continue to monitor hemovac drain. Drain is not ready to be d/c'd at this time due to output. Will re-evaluate drain output in the morning. AM Labs to be drawn as d/w Floor RN.      VALENTIN Lopez  Orthopaedic Spine Surgery  Physician Assistant to Dr. Becka Littlejohn

## 2020-01-30 NOTE — PROGRESS NOTES
Bedside and Verbal shift change report given to Sal Weinberg   (oncoming nurse) by Eleanor Gallego   (offgoing nurse). Report included the following information SBAR, Kardex, Intake/Output and MAR.

## 2020-01-30 NOTE — ROUTINE PROCESS
Bedside and Verbal shift change report given to Ricco (oncoming nurse) by Paul Yeager (offgoing nurse). Report included the following information SBAR, Kardex, ED Summary, OR Summary, Procedure Summary, Intake/Output, MAR and Recent Results.

## 2020-01-31 VITALS
TEMPERATURE: 98.6 F | DIASTOLIC BLOOD PRESSURE: 82 MMHG | OXYGEN SATURATION: 98 % | RESPIRATION RATE: 16 BRPM | WEIGHT: 213.44 LBS | SYSTOLIC BLOOD PRESSURE: 142 MMHG | BODY MASS INDEX: 30.63 KG/M2 | HEART RATE: 94 BPM

## 2020-01-31 LAB
ANION GAP SERPL CALC-SCNC: 6 MMOL/L (ref 5–15)
BUN SERPL-MCNC: 15 MG/DL (ref 6–20)
BUN/CREAT SERPL: 28 (ref 12–20)
CALCIUM SERPL-MCNC: 7.8 MG/DL (ref 8.5–10.1)
CHLORIDE SERPL-SCNC: 108 MMOL/L (ref 97–108)
CO2 SERPL-SCNC: 30 MMOL/L (ref 21–32)
CREAT SERPL-MCNC: 0.53 MG/DL (ref 0.55–1.02)
GLUCOSE SERPL-MCNC: 126 MG/DL (ref 65–100)
HGB BLD-MCNC: 8.6 G/DL (ref 11.5–16)
POTASSIUM SERPL-SCNC: 4.1 MMOL/L (ref 3.5–5.1)
SODIUM SERPL-SCNC: 144 MMOL/L (ref 136–145)

## 2020-01-31 PROCEDURE — 85018 HEMOGLOBIN: CPT

## 2020-01-31 PROCEDURE — 80048 BASIC METABOLIC PNL TOTAL CA: CPT

## 2020-01-31 PROCEDURE — 36415 COLL VENOUS BLD VENIPUNCTURE: CPT

## 2020-01-31 PROCEDURE — 77030040361 HC SLV COMPR DVT MDII -B

## 2020-01-31 PROCEDURE — 94760 N-INVAS EAR/PLS OXIMETRY 1: CPT

## 2020-01-31 PROCEDURE — 74011250637 HC RX REV CODE- 250/637: Performed by: ORTHOPAEDIC SURGERY

## 2020-01-31 RX ADMIN — PANTOPRAZOLE SODIUM 40 MG: 40 TABLET, DELAYED RELEASE ORAL at 06:58

## 2020-01-31 RX ADMIN — POLYETHYLENE GLYCOL 3350 17 G: 17 POWDER, FOR SOLUTION ORAL at 08:41

## 2020-01-31 RX ADMIN — OXYCODONE 10 MG: 5 TABLET ORAL at 03:31

## 2020-01-31 RX ADMIN — PREGABALIN 75 MG: 75 CAPSULE ORAL at 08:41

## 2020-01-31 RX ADMIN — OXYCODONE 10 MG: 5 TABLET ORAL at 08:41

## 2020-01-31 RX ADMIN — CHOLECALCIFEROL TAB 125 MCG (5000 UNIT) 5000 UNITS: 125 TAB at 08:41

## 2020-01-31 RX ADMIN — LIOTHYRONINE SODIUM 5 MCG: 5 TABLET ORAL at 06:58

## 2020-01-31 RX ADMIN — SENNOSIDES AND DOCUSATE SODIUM 1 TABLET: 8.6; 5 TABLET ORAL at 08:41

## 2020-01-31 NOTE — PROGRESS NOTES
1/31/2020  1:17 PM  Pt Dc noted. Pt will be followed by [facility] services, and CM attached DC clinicals via [facility]. Pt's  will transport. No additional DC service needs indicated.

## 2020-01-31 NOTE — PROGRESS NOTES
Discharge instructions discussed with patient. Verbalized understanding. New prescribed medications discussed. Opportunity to ask questions given. IV removed. Hemovacc drain removed, site dressed with sterile 4x4 and transparent film. Signature obtained. Copies given.

## 2020-01-31 NOTE — PROGRESS NOTES
ORTHOPAEDIC LUMBAR FUSION PROGRESS NOTE    NAME:     Sivakumar Ken   :       1945   MRN:       [id number]   DATE:      2020    POD:    4 Days Post-Op  S/P:    Procedure(s):  L2-L5 LAMINECTOMY, FUSION, INSTRUMENTATION, TLIF, BONE GRAFT    SUBJECTIVE:    C/O back pain along surgical incision  No leg pain or numbness  Denies nausea/vomiting, headache, chest pain or shortness of breath  Pain controlled    Recent Labs     20  0239   HGB 8.6*      K 4.1      CO2 30   BUN 15   CREA 0.53*   *     Patient Vitals for the past 12 hrs:   BP Temp Pulse Resp SpO2   20 0751 121/73 98 °F (36.7 °C) 92 20 98 %   20 0324 155/75 98.7 °F (37.1 °C) 87 18 97 %   20 2305 148/68 98.3 °F (36.8 °C) 78 18 94 %       EXAM:  Dressings clean, dry and intact   Positive strength/ROM bilat lower ext.   Neuro intact to sensation  Calves, soft & nontender  BL LEs NVID      PLAN:  Continue prn PO pain medications  PT/OT, OOB w/ assist  Tolerating diet  Hemovac drain can be d/c'd   Confirmed w/ PT that pt has been cleared w/ d/c  D/C to home w/ home health today      Florin Dowell [place]  Orthopaedic Surgery  Physician Assistant to Dr. Fab Hagen

## 2020-01-31 NOTE — INTERDISCIPLINARY ROUNDS
Interdisciplinary team rounds were held at the bedside with the following team members: Nurse Practitioner, Pharmacy. Plan of care reviewed and all questions answered.

## 2020-01-31 NOTE — DISCHARGE INSTRUCTIONS
YOU CAN RE-START TAKING YOUR DAILY FISH OIL WHEN YOU ARE 1 WEEK OUT FROM SURGERY                       Lumbar Spinal Surgery Discharge Instructions   Dr. Hua Loge  [phone]    Activity:    [facility] You are going home a well person, be as active as possible. Your only exercise should be walking. Start with short frequent walks and increase your walking distance each day. Start with walking twice a day for 5 minutes and increase your distance each day 2-3 minutes until you reach 25 minutes twice a day. Limit the amount of time you sit to 20-30 minute intervals. Sitting for prolonged periods of time will be uncomfortable for you following your surgery.  No bending, lifting (of 5lbs or more), twisting, or straining.  Do not drive until your doctor states you may do so. However, you may ride in a car for short distances.  If you are required to wear a brace, you should wear it at all times when you are out of bed. You may remove it when sleeping unless your physician advises you against it.  When you are in the bed, you may lay on your back or on either side. Do not lie on your stomach.  You may resume sexual relations 3-4 weeks after surgery depending on how you are feeling.  Continue to use your incentive spirometer for deep breathing exercises. Driving:   You may not drive or return to work until instructed by your physician. However, you may ride in the car for short periods of time. Brace:   If you have a back brace, you should wear your brace at all times when you are out of bed. Do not wear the brace while in bed or showering.  Remember to always wear a cotton t-shirt underneath your brace.  Do not bend or twist when your brace is off. Diet:   You may resume your regular home diet as tolerated. If your throat is sore, you should eat soft foods for the first couple of days.  Be sure to drink plenty of fluids; it is important to keep yourself hydrated.     Avoid alcoholic beverages and ABSOLUTELY NO tobacco products. Tobacco products will interfere with your healing. If you continue to use tobacco, you may end up needing another surgery in the future. Dressing: You have on a Prineo dressing. This is a waterproof bacteriostatic dressing that  requires no post-operative care. Please do not peel the dressing off, or apply any  oil based products, as they may expedite the deterioration of the mesh. The  dressing will slowly chip off on its own.  Do not rub or apply lotion or ointments to incision site. Shower:   You may shower 5 days after your surgery.  You may remove your brace during showers.  NO not use tub baths, swimming pools or Jacuzzis. Medications:   Check with your physician before taking any anti-inflammatory medications. (Advil, Aleve, Ibuprofen, Aspirin)   Take your pain medication as directed   Do NOT take additional Tylenol if your prescribed pain medication has acetaminophen in it (Endocet/Percocet, Lortab, Norco)   It is important to have regular bowel movements. Pain medications can be constipating. Stool softeners, warm prune juice, increasing your water intake, and increasing fiber in your diet can help in preventing constipation.  Do NOT take laxatives if at all possible except in severe situations. It can result in a vicious cycle of constipation and diarrhea. Follow-Up    Please call ASAP to schedule your 1st post-operative appointment. This should be approximately 3 weeks from your surgery date. Notify your physician in you develop any of the following conditions:   Fever above 101 degrees for 24 hours.  Nausea or vomiting.  Severe headache.  Inability to urinate   Loss of bowel or bladder function (sudden onset of incontinence)   Changes in sensation in your extremities (numbness, tingling, loss of color).  Severe pain or pain not relieved by medications.    Redness, swelling, or drainage from your incision.  Persistent pain in the chest.    Pain in the calf of either leg.  Increased weakness (if this is greater than before your surgery). If you have any questions about your dressing contact your Orthopaedic Surgeons office. OFFICE OF DR. Kolleen Spatz   [phone]  [place] [address] [address] [place] [address], [facility] [address], [address] [facility]     ** IMPORTANT: UNDER YOUR OPTIFOAM DRESSING, YOU HAVE AN ADHESIVE MESH DIRECTLY OVER YOUR INCISION. THIS MESH WAS STERILELY GLUED TO YOUR SKIN DURING SURGERY. DO NOT REMOVE THIS. NO ONE ELSE SHOULD REMOVE IT EITHER. IT WILL COME OFF ON ITS OWN OVER TIME    YOUR OPTIFOAM DRESSING SHOULD REMAIN IN PLACE FOR 1 WEEK. IT IS A WATERPROOF DRESSING AS LONG AS IT'S PLACEMENT IS INTACT. YOU CAN SHOWER AS INDICATED BELOW IN YOUR DISCHARGE INSTRUCTIONS    * WEAR YOUR BRACE AS ADVISED    * NO DRIVING UNTIL YOU ARE CLEARED TO DO SO BY YOUR SURGEON    * LIMIT LIFTING, BENDING AND TWISTING.  NO LIFTING MORE THAN 5 LBS    * MAKE SURE YOU ARE GETTING GOOD NUTRITION (Lean Protein, Vitamin D AND Calcium)    * DO NOT TAKE ANY NSAIDs FOR THE FIRST 3 MONTHS AFTER SURGERY (such as Advil/Ibuprofen/Motrin, Aleve/Naproxen/Naprosyn, Diclofenac, Celebrex, Meloxicam, Indomethacin, Goody's powder, BC powder etc.)    * NO NICOTINE PRODUCTS    * FULLY READ YOUR DISCHARGE INSTRUCTIONS

## 2020-01-31 NOTE — PROGRESS NOTES
Nutrition Assessment:    RECOMMENDATIONS/INTERVENTION(S):   1. Continue regular diet. 2. Continue to monitor intakes, wt changes, labs. ASSESSMENT:   1/31: Pt assessed for LOS. Admitted with lumbar stenosis, s/p laminectomy. PMH includes HTN, GERD. BMI 30.6, c/w overweight for age. Pt reports good appetite, says she is eating normally to how she eats at home. No c/o N/V. Last BM 1/27, on bowel regimen. Denies any recent weight changes. Discussed sources of protein and its importance for healing. Pt says she eats plenty of protein and denies ONS at this time. Diet Order: Regular  % Eaten:    Patient Vitals for the past 72 hrs:   % Diet Eaten   01/29/20 1649 40 %   01/29/20 1300 60 %   01/29/20 0900 80 %       Pertinent Medications: [x] Reviewed: Vit. D3, Protonix, bowel reigmne    Labs: [x] Reviewed: Ca 7.8    Anthropometrics: Height:   Weight: 96.8 kg (213 lb 7 oz)    IBW (%IBW):   ( ) UBW (%UBW):   (  %)      BMI: Body mass index is 30.63 kg/m². This BMI is indicative of:   [] Underweight    [] Normal    [x] Overweight    []  Obesity    []  Extreme Obesity (BMI>40)  Estimated Nutrition Needs (Based on): 2007 Kcals/day(1544 x 1.3 AF) , 97 g(1-1.2 g/kg) Protein  Carbohydrate: At Least 130 g/day  Fluids: 2007 mL/day (1 ml/kcal)    Last BM: 1/27   [x]Active     []Hyperactive  []Hypoactive       [] Absent   BS  Skin:    [] Intact   [x] Incision  [] Breakdown   [] DTI   [] Tears/Excoriation/Abrasion  []Edema [] Other:      Wt Readings from Last 30 Encounters:   01/29/20 96.8 kg (213 lb 7 oz)   01/21/20 96.8 kg (213 lb 7 oz)   09/28/15 99.8 kg (220 lb)   09/16/15 99.8 kg (220 lb)   03/02/15 97.5 kg (215 lb)   02/11/15 97.5 kg (215 lb)   11/04/14 101.2 kg (223 lb)   08/11/11 93 kg (205 lb)   08/04/11 94.3 kg (208 lb)   05/24/11 92.1 kg (203 lb)      NUTRITION DIAGNOSES:   Problem:  Increased nutrient needs     Etiology: related to increased demand for protein     Signs/Symptoms: as evidenced by s/p laminectomy NUTRITION INTERVENTIONS:  Meals/Snacks: General/healthful diet                  GOAL:   PO intake >50% meals with at least one high-protein source at each meal next 5-7 days    Cultural, Judaism, or Ethnic Dietary Needs: None     EDUCATION & DISCHARGE NEEDS:    [x] None Identified   [] Identified and Education Provided/Documented   [] Identified and Pt declined/was not appropriate      [] Interdisciplinary Care Plan Reviewed/Documented    [x] Discharge Needs: regular diet   [] No Nutrition Related Discharge Needs    NUTRITION RISK:   Pt Is At Nutrition Risk  [x]     No Nutrition Risk Identified  []       PT SEEN FOR:    []  MD Consult: []Calorie Count      []Diabetic Diet Education        []Diet Education     []Electrolyte Management     []General Nutrition Management and Supplements     []Management of Tube Feeding     []TPN Recommendations    []  RN Referral:  []MST score >=2     []Enteral/Parenteral Nutrition PTA     []Pregnant: Gestational DM or Multigestation                 [] Pressure Ulcer    []  Low BMI      [x]  Length of Stay       [] Dysphagia Diet         [] Ventilator  []  Follow-up     Previous Recommendations:   [] Implemented          [] Not Implemented          [x] Not Applicable    Previous Goal:   [] Met              [] Progressing Towards Goal              [] Not Progressing Towards Goal   [x] Not Applicable            Dionne Scott, [address] [facility]  Pager [phone]  Phone [phone]

## 2020-01-31 NOTE — PROGRESS NOTES
CMS Note  1/31/2020    Patient received and signed their 2nd IMM letter. Patient was given a copy for their record.   Kassidy Tovar CMS

## 2020-01-31 NOTE — PROGRESS NOTES
PHYSICAL THERAPY:Pt is CGA for basic mobility and is ambulating 200ft with RW. Pt cleared on steps yesterday. Pt is cleared for D/C by PT.

## 2020-02-14 NOTE — DISCHARGE SUMMARY
DISCHARGE SUMMARY     Patient: Sivakumar Ken                             Medical Record Number: [id number]                : 1945  Age: 75 y.o. Admit Date: 2020  Discharge Date: 2020  Admission Diagnosis: Spondylolisthesis of lumbar region [M43.16]  Pseudoclaudication syndrome [M48.062]  Lumbar stenosis with neurogenic claudication [M48.062]  Discharge Diagnosis: Spondylolisthesis of lumbar region [M43.16]  Pseudoclaudication syndrome [M48.062]  Procedures: Procedure(s):  L2-L5 LAMINECTOMY, FUSION, INSTRUMENTATION, TLIF, BONE GRAFT  Surgeon: Fab Hagen MD  Co-surgeon:   Assistants:   Anesthesia: General  Complications: None     History of Present Illness:  Sivakumar Ken is a 75 y.o. female with a history of intractable low back and radiculopathy. Despite conservative management and after clinical and radiographic evaluation, it was determined that she suffered from lumbar stenosis  and lumbar spondylolisthesis and would benefit from Procedure(s):  L2-L5 LAMINECTOMY, FUSION, INSTRUMENTATION, TLIF, BONE GRAFT, which she consented to undergo after a discussion of the risks, benefits, alternatives, rehab concerns, and potential complications of surgery. Hospital Course:  Sivakumar Ken tolerated the procedure well. She was transferred  to the recovery room in stable condition. After a brief stay, the patient was then transferred to the Orthopedic floor. On postoperative day #1, the dressing was clean and dry and she was neurovascularly intact. The patient was afebrile and vital signs were stable. Calves were soft and non-tender bilaterally. Sivakumar Ken made excellent progress with physical therapy and was discharged to Home with Home Health in stable condition on postoperative day 4.   She was provided with routine postoperative instructions and advised to follow up in my office in 3 weeks following discharge from the hospital.  She was given prescriptions for medication to control post-operative symptoms. Discharge Medications:  Discharge Medication List as of 1/31/2020 12:42 PM      START taking these medications    Details   cyclobenzaprine (FLEXERIL) 10 mg tablet Take 1 Tab by mouth three (3) times daily as needed for Muscle Spasm(s). , Normal, Disp-30 Tab, R-0      oxyCODONE IR (ROXICODONE) 5 mg immediate release tablet Take 1-2 Tabs by mouth every four (4) hours as needed for Pain for up to 7 days. Max Daily Amount: 60 mg., Normal, Disp-50 Tab, R-0      senna-docusate (PERICOLACE) 8.6-50 mg per tablet Take 1 Tab by mouth two (2) times a day., Normal, Disp-60 Tab, R-0         CONTINUE these medications which have NOT CHANGED    Details   pantoprazole (PROTONIX) 40 mg tablet Take 40 mg by mouth daily. , Historical Med      pregabalin (LYRICA) 75 mg capsule Take 75 mg by mouth two (2) times a day., Historical Med      conjugated estrogens (PREMARIN) 0.3 mg tablet Take 0.3 mg by mouth every evening., Historical Med      levothyroxine (SYNTHROID) 112 mcg tablet Take 112 mcg by mouth every morning. NO GENERIC>>>>MUST HAVE BRAND NAME, Historical Med      liothyronine (CYTOMEL) 5 mcg tablet Take 5 mcg by mouth daily (after lunch). , Historical Med      losartan (COZAAR) 50 mg tablet Take 50 mg by mouth nightly., Historical Med      eszopiclone (LUNESTA) 2 mg tablet Take 2 mg by mouth nightly as needed., Historical Med      atorvastatin (LIPITOR) 20 mg tablet Take 20 mg by mouth every evening. Indications: high cholesterol, Historical Med      cetirizine (ZYRTEC) 10 mg tablet Take 10 mg by mouth nightly as needed. Indications: SEASONAL ALLERGIC RHINITIS, Historical Med      vit A/vit C/vit E/zinc/copper (PRESERVISION AREDS PO) Take 1 Tab by mouth two (2) times a day., Historical Med      cholecalciferol (VITAMIN D3) (5000 Units/125 mcg) tab tablet Take 5,000 Units by mouth daily. , Historical Med      potassium 99 mg tablet Take 99 mg by mouth daily. , Historical Med      coenzyme q10-vitamin e (COQ10 ) 100-100 mg-unit cap Take 200 mg by mouth daily (after breakfast). , Historical Med      cyanocobalamin (VITAMIN B-12) 1,000 mcg tablet Take 2,500 mcg by mouth daily (after breakfast). , Historical Med      MULTI-VITAMIN PO Take 1 Tab by mouth daily (after breakfast). , Historical Med         STOP taking these medications       omega-3 acid ethyl esters (LOVAZA) 1 gram capsule Comments:   Reason for Stopping:               VALENTIN Brown  1/31/2020  Orthopaedic Surgery  Physician Assistant to Dr. Dee Wyman

## 2020-03-13 ENCOUNTER — HOSPITAL ENCOUNTER (OUTPATIENT)
Dept: VASCULAR SURGERY | Age: 75
Discharge: HOME | End: 2020-03-13
Attending: ORTHOPAEDIC SURGERY
Payer: MEDICARE

## 2020-03-13 DIAGNOSIS — M79.89 SWELLING OF LIMB: ICD-10-CM

## 2020-03-13 DIAGNOSIS — Z98.1 ARTHRODESIS STATUS: ICD-10-CM

## 2020-03-13 PROCEDURE — 93970 EXTREMITY STUDY: CPT

## 2020-04-14 ENCOUNTER — HOSPITAL ENCOUNTER (OUTPATIENT)
Dept: MRI IMAGING | Age: 75
Discharge: HOME | End: 2020-04-14
Attending: ORTHOPAEDIC SURGERY
Payer: MEDICARE

## 2020-04-14 VITALS — BODY MASS INDEX: 30.28 KG/M2 | WEIGHT: 211 LBS

## 2020-04-14 DIAGNOSIS — Z98.1 S/P SPINAL FUSION: ICD-10-CM

## 2020-04-14 DIAGNOSIS — M54.16 LUMBAR RADICULOPATHY: ICD-10-CM

## 2020-04-14 PROCEDURE — A9575 INJ GADOTERATE MEGLUMI 0.1ML: HCPCS | Performed by: RADIOLOGY

## 2020-04-14 PROCEDURE — 74011250636 HC RX REV CODE- 250/636: Performed by: RADIOLOGY

## 2020-04-14 PROCEDURE — 72158 MRI LUMBAR SPINE W/O & W/DYE: CPT

## 2020-04-14 RX ORDER — GADOTERATE MEGLUMINE 376.9 MG/ML
15 INJECTION INTRAVENOUS
Status: COMPLETED | OUTPATIENT
Start: 2020-04-14 | End: 2020-04-14

## 2020-04-14 RX ADMIN — GADOTERATE MEGLUMINE 15 ML: 376.9 INJECTION INTRAVENOUS at 11:46

## 2020-05-06 ENCOUNTER — HOSPITAL ENCOUNTER (OUTPATIENT)
Dept: PREADMISSION TESTING | Age: 75
Discharge: HOME | End: 2020-05-06
Payer: MEDICARE

## 2020-05-06 VITALS
WEIGHT: 214 LBS | RESPIRATION RATE: 18 BRPM | HEIGHT: 71 IN | HEART RATE: 105 BPM | OXYGEN SATURATION: 95 % | TEMPERATURE: 97.7 F | BODY MASS INDEX: 29.96 KG/M2 | SYSTOLIC BLOOD PRESSURE: 195 MMHG | DIASTOLIC BLOOD PRESSURE: 79 MMHG

## 2020-05-06 LAB
25(OH)D3 SERPL-MCNC: 38.1 NG/ML (ref 30–100)
ABO + RH BLD: NORMAL
ALBUMIN SERPL-MCNC: 3.5 G/DL (ref 3.5–5)
ALBUMIN/GLOB SERPL: 1 {RATIO} (ref 1.1–2.2)
ALP SERPL-CCNC: 88 U/L (ref 45–117)
ALT SERPL-CCNC: 16 U/L (ref 12–78)
ANION GAP SERPL CALC-SCNC: 7 MMOL/L (ref 5–15)
APPEARANCE UR: CLEAR
APTT PPP: 24.8 SEC (ref 22.1–32)
AST SERPL-CCNC: 15 U/L (ref 15–37)
BACTERIA URNS QL MICRO: NEGATIVE /HPF
BASOPHILS # BLD: 0 K/UL (ref 0–0.1)
BASOPHILS NFR BLD: 1 % (ref 0–1)
BILIRUB SERPL-MCNC: 0.4 MG/DL (ref 0.2–1)
BILIRUB UR QL: NEGATIVE
BLOOD GROUP ANTIBODIES SERPL: NORMAL
BUN SERPL-MCNC: 16 MG/DL (ref 6–20)
BUN/CREAT SERPL: 20 (ref 12–20)
CALCIUM SERPL-MCNC: 9.5 MG/DL (ref 8.5–10.1)
CHLORIDE SERPL-SCNC: 106 MMOL/L (ref 97–108)
CO2 SERPL-SCNC: 28 MMOL/L (ref 21–32)
COLOR UR: NORMAL
CREAT SERPL-MCNC: 0.8 MG/DL (ref 0.55–1.02)
DIFFERENTIAL METHOD BLD: ABNORMAL
EOSINOPHIL # BLD: 0 K/UL (ref 0–0.4)
EOSINOPHIL NFR BLD: 1 % (ref 0–7)
EPITH CASTS URNS QL MICRO: NORMAL /LPF
ERYTHROCYTE [DISTWIDTH] IN BLOOD BY AUTOMATED COUNT: 14.2 % (ref 11.5–14.5)
EST. AVERAGE GLUCOSE BLD GHB EST-MCNC: 123 MG/DL
GLOBULIN SER CALC-MCNC: 3.4 G/DL (ref 2–4)
GLUCOSE SERPL-MCNC: 208 MG/DL (ref 65–100)
GLUCOSE UR STRIP.AUTO-MCNC: NEGATIVE MG/DL
HBA1C MFR BLD: 5.9 % (ref 4–5.6)
HCT VFR BLD AUTO: 40.4 % (ref 35–47)
HGB BLD-MCNC: 12.6 G/DL (ref 11.5–16)
HGB UR QL STRIP: NEGATIVE
IMM GRANULOCYTES # BLD AUTO: 0 K/UL (ref 0–0.04)
IMM GRANULOCYTES NFR BLD AUTO: 0 % (ref 0–0.5)
INR PPP: 1.1 (ref 0.9–1.1)
KETONES UR QL STRIP.AUTO: NEGATIVE MG/DL
LEUKOCYTE ESTERASE UR QL STRIP.AUTO: NEGATIVE
LYMPHOCYTES # BLD: 1 K/UL (ref 0.8–3.5)
LYMPHOCYTES NFR BLD: 14 % (ref 12–49)
MCH RBC QN AUTO: 28.7 PG (ref 26–34)
MCHC RBC AUTO-ENTMCNC: 31.2 G/DL (ref 30–36.5)
MCV RBC AUTO: 92 FL (ref 80–99)
MONOCYTES # BLD: 0.6 K/UL (ref 0–1)
MONOCYTES NFR BLD: 8 % (ref 5–13)
NEUTS SEG # BLD: 5.2 K/UL (ref 1.8–8)
NEUTS SEG NFR BLD: 76 % (ref 32–75)
NITRITE UR QL STRIP.AUTO: NEGATIVE
NRBC # BLD: 0 K/UL (ref 0–0.01)
NRBC BLD-RTO: 0 PER 100 WBC
PH UR STRIP: 6.5 [PH] (ref 5–8)
PLATELET # BLD AUTO: 176 K/UL (ref 150–400)
POTASSIUM SERPL-SCNC: 4.7 MMOL/L (ref 3.5–5.1)
PROT SERPL-MCNC: 6.9 G/DL (ref 6.4–8.2)
PROT UR STRIP-MCNC: NEGATIVE MG/DL
PROTHROMBIN TIME: 11 SEC (ref 9–11.1)
RBC # BLD AUTO: 4.39 M/UL (ref 3.8–5.2)
RBC #/AREA URNS HPF: NORMAL /HPF (ref 0–5)
SODIUM SERPL-SCNC: 141 MMOL/L (ref 136–145)
SP GR UR REFRACTOMETRY: 1.01 (ref 1–1.03)
SPECIMEN EXP DATE BLD: NORMAL
THERAPEUTIC RANGE,PTTT: NORMAL SECS (ref 58–77)
UA: UC IF INDICATED,UAUC: NORMAL
UROBILINOGEN UR QL STRIP.AUTO: 0.2 EU/DL (ref 0.2–1)
WBC # BLD AUTO: 6.9 K/UL (ref 3.6–11)
WBC URNS QL MICRO: NORMAL /HPF (ref 0–4)

## 2020-05-06 PROCEDURE — 86900 BLOOD TYPING SEROLOGIC ABO: CPT

## 2020-05-06 PROCEDURE — 83036 HEMOGLOBIN GLYCOSYLATED A1C: CPT

## 2020-05-06 PROCEDURE — 80053 COMPREHEN METABOLIC PANEL: CPT

## 2020-05-06 PROCEDURE — 36415 COLL VENOUS BLD VENIPUNCTURE: CPT

## 2020-05-06 PROCEDURE — 85730 THROMBOPLASTIN TIME PARTIAL: CPT

## 2020-05-06 PROCEDURE — 85025 COMPLETE CBC W/AUTO DIFF WBC: CPT

## 2020-05-06 PROCEDURE — 82306 VITAMIN D 25 HYDROXY: CPT

## 2020-05-06 PROCEDURE — 85610 PROTHROMBIN TIME: CPT

## 2020-05-06 PROCEDURE — 81001 URINALYSIS AUTO W/SCOPE: CPT

## 2020-05-06 RX ORDER — OMEGA-3-ACID ETHYL ESTERS 1 G/1
1 CAPSULE, LIQUID FILLED ORAL DAILY
COMMUNITY
End: 2020-05-13

## 2020-05-06 RX ORDER — HYDROCODONE BITARTRATE AND ACETAMINOPHEN 5; 325 MG/1; MG/1
1 TABLET ORAL
COMMUNITY
End: 2020-05-13

## 2020-05-06 RX ORDER — GABAPENTIN 300 MG/1
1 TABLET, FILM COATED ORAL DAILY
COMMUNITY

## 2020-05-06 NOTE — PERIOP NOTES
[address] [facility] [address], [address] [facility]   MAIN OR                                  [phone]   MAIN PRE OP                          [phone]                                                                                AMBULATORY PRE OP          [phone]  PRE-ADMISSION TESTING    [phone]   Surgery Date:   5/11/2020       Is surgery arrival time given by surgeon? NO  If NO, [facility] staff will call you between 3 and 7pm the day before your surgery with your arrival time. (If your surgery is on a Monday, we will call you the Friday before.)    Call [phone] after 7pm Monday-Friday if you did not receive this call. INSTRUCTIONS BEFORE YOUR SURGERY   When You  Arrive Arrive at the [address] [facility] on the day of your surgery  Have your insurance card, photo ID, and any copayment (if needed)   Food   and   Drink NO food or drink after midnight the night before surgery    This means NO water, gum, mints, coffee, juice, etc.  No alcohol (beer, wine, liquor) 24 hours before and after surgery   Medications to   TAKE   Morning of Surgery MEDICATIONS TO TAKE THE MORNING OF SURGERY WITH A SIP OF WATER:    Protonix   Zyrtec   Synthroid   Cytomel    Do not take your Losartan the evening before surgery. Medications  To  STOP      7 days before surgery  Non-Steroidal anti-inflammatory Drugs (NSAID's): for example, Ibuprofen (Advil, Motrin), Naproxen (Aleve)   Aspirin, if taking for pain    Herbal supplements, vitamins, and fish oil   Lovaza    (Pain medications not listed above, including Tylenol may be taken)   Blood  Thinners  If you take  Aspirin, Plavix, Coumadin, or any blood-thinning or anti-blood clot medicine, talk to the doctor who prescribed the medications for pre-operative instructions.    Bathing Clothing  Jewelry  Valuables      If you shower the morning of surgery, please do not apply anything to your skin (lotions, powders, deodorant, or makeup, especially mascara)   Follow Chlorhexidine Care Fusion body wash instructions provided to you during PAT appointment. Begin 3 days prior to surgery.  Do not shave or trim anywhere 24 hours before surgery   Wear your hair loose or down; no pony-tails, buns, or metal hair clips   Wear loose, comfortable, clean clothes   Wear glasses instead of contacts   Leave money, valuables, and jewelry, including body piercings, at home   Going Home - or Spending the Night  SAME-DAY SURGERY: You must have a responsible adult drive you home and stay with you 24 hours after surgery   ADMITS: If your doctor is keeping you in the hospital after surgery, leave personal belongings/luggage in your car until you have a hospital room number. Hospital discharge time is 12 noon  Drivers must be here before 12 noon unless you are told differently   Special Instructions  All surgical patients are being tested for COVID. Your COVID testing will be completed on Friday, May 8th between 9am-11am. It is curbside in front of the medical office building. Follow the blue curbside testing signs when you enter. Follow all instructions so your surgery wont be cancelled. Please, be on time. If a situation occurs and you are delayed the day of surgery, call [phone]. If your physical condition changes (like a fever, cold, flu, etc.) call your surgeon. Home medication(s) reviewed and verified verbally and with list.  during PAT appointment. The patient was contacted  in person. The patient verbalizes understanding of all instructions and does not need reinforcement.

## 2020-05-07 LAB
BACTERIA SPEC CULT: NORMAL
BACTERIA SPEC CULT: NORMAL
SERVICE CMNT-IMP: NORMAL

## 2020-05-07 NOTE — H&P
Preoperative Evaluation                     History and Physical with Surgical Risk Stratification     5/6/2020    CC: Back pain  Surgery: Revision L5-S1 Laminectomy     HPI:   Milo Duffy is a 67 y.o. female referred for pre-operative evaluation by Dr. Dragan Leon for surgery on 5/11/20. Mrs. Laurie Zayas comes today in apparent pain and unable to walk fully. She is using rollator and wheelchair for transportation. She had low back surgery on 1/27/20 and did well until her 3 week check up. When they did repeat X-rays she states her cage has  and some of the screw have backed out. They wanted to wait to see if anything stabilized before doing more surgery. She notes at that time her pain was there but tolerable but now she is in severe 10/10 pain all the time. She has been wearing her back brace when out of bed. She describes her pain as sharp. She notes she has also developed swelling and tightness of both lower legs since the surgery. She states she has been tested for DVT by surgeon. Her  adds that after her surgery she developed problems with her right knee and they have been trying to strengthen the muscle at home with exercises. The patient was evaluated in the surgeon's office and it was determined that the most appropriate plan of care is to proceed with surgical intervention. Patient's PCP Juanpablo Vidales MD    Review of Systems     Constitutional: Negative for chills and fever  HENT: Negative for congestion and sore throat  Eyes: negative for blurred vision and double vision  Respiratory: Negative for cough, shortness of breath and wheezing  Mouth: Negative for loose, broken or chipped teeth.     Cardiovascular: Negative for chest pain and palpitations  Gastrointestinal: Negative for abdominal pain, constipation, diarrhea and nausea  Genitourinary: Negative for dysuria and hematuria  Musculoskeletal: Positive for low back pain, swelling of bilateral lower legs  Skin: Negative for rash, open wounds. Negative for bruises easily  Neurological: Negative for dizziness, tremors and headaches  Psychiatric: Negative for depression. The patient is not nervous/anxious. Inherent Risk of Surgery     Surgical risk:  Intermediate  Low:  EIntermediate:   Spinal surgeryHigh:    Patient Cardiac Risk Assessment     Revised Cardiac Risk Index (RCRI)    Rate if cardiac death, nonfatal MI, nonfatal cardiac arrest by number of risk factor- 0.4%    ANDRA/AHA 2007 Guidelines:   1) Surgery Emergency, Non-cardiac -> to surgery  2) If not, look at clinical predictors    Major Intermediate Minor     Uncontrolled HTN- white coat syndrome  Blood Thinner: NA    METS      EQUAL TO 4 Care for self Walk indoors around house Walk 2-3 blocks on level ground (2-3 mph) Light work around house (dust, dishes)     Other Risk Factors:   Screening for ETOH use:  Done and low risk  Smoking status:   None    Personal or FH of bleeding problems:  No  Personal or FH of blood clots:  No  Personal or FH of anesthesia problems:   No    Pulmonary Risk:  Asthma or COPD:  No  Body mass index is 29.85 kg/m². Known RACHELLE:  Yes  Albumin normal, BUN normal    Past Medical, Surgical, Social History     Allergies:    Allergies   Allergen Reactions    Adhesive Tape-Silicones Other (comments)     Skin tears       Past Medical History:   Diagnosis Date    Allergic rhinitis     Arthritis     Osteo    Bilateral leg edema 01/30/2020    Post lumbar surgery    Central sleep apnea     uses a mouth piece    Chronic pain     arthritis pain    Difficult intubation 2011    during total surgery at [place] [facility] 2011- anesthesia note in [facility] reported no complications      GERD (gastroesophageal reflux disease)     Hx of echocardiogram 01/28/2019    Normal- [facility]    Hyperlipidemia     Hypertension     Well controlled with medications- see BP list in chart    Melanoma ([address] 2012 & 2015    x 3    PONV (postoperative nausea and vomiting)     Prediabetes 2020    5.9    Thyroid cancer ([facility] [address] 2013    White coat syndrome with hypertension     her BP will increase to 200's/100's- Cardio and PCP aware, runs normal at home     Past Surgical History:   Procedure Laterality Date    HX CHOLECYSTECTOMY  2006    HX DILATION AND CURETTAGE      over 40 years ago    HX HIP REPLACEMENT Right 2001    HX HYSTERECTOMY  2012    HX KNEE REPLACEMENT Left 2015    HX LUMBAR LAMINECTOMY N/A 2020    L2-L5    HX MALIGNANT SKIN LESION EXCISION  ,,    HX MENISCUS REPAIR Left 2015    HX SHOULDER REPLACEMENT Bilateral  &     HX THYROIDECTOMY  2013     Social History     Tobacco Use    Smoking status: Former Smoker     Packs/day: 0.25     Years: 1.00     Pack years: 0.25     Types: Cigarettes     Last attempt to quit: 1962     Years since quittin.11    Smokeless tobacco: Never Used   Substance Use Topics    Alcohol use: No    Drug use: No     Family History   Problem Relation Age of Onset    Heart Disease Father     Heart Attack Father     Heart Disease Sister     Diabetes Sister     Heart Attack Sister 48    Dementia Mother     Post-op Nausea/Vomiting Daughter     Anesth Problems Neg Hx     Deep Vein Thrombosis Neg Hx        Objective     Vitals:    20 1418 20 1500   BP: (!) 213/99 195/79   Pulse: (!) 105    Resp: 18    Temp: 97.7 °F (36.5 °C)    SpO2: 95%    Weight: 97.1 kg (214 lb)    Height: 5' 11\" (1.803 m)        Constitutional:  Appears well,  No Acute Distress, Vitals noted  Psychiatric:   Affect normal, Alert and Oriented to person/place/time    Eyes:   Pupils equally round and reactive, EOMI, conjunctiva clear, eyelids normal  ENT:   External ears and nose normal/lips, teeth normal, gums normal, TMs and Orophyarynx normal  Neck:   General inspection and Thyroid normal.  No abnormal cervical or supraclavicular nodes    Lungs:   Clear to auscultation, good respiratory effort  Heart:    Ausculation normal.  Regular rhythm. Tachycardia. No cardiac murmurs. No carotid bruits or palpable thrills  Chest wall normal  Musculoskeletal: Gait antalgic. Extremities:   2+ pitting edema to bilateral lower legs, good pulses  Skin:   Warm to palpation, without rashes, bruising, or suspicious lesions. Dry flaky skin to both lower legs    Recent Results (from the past 72 hour(s))   CBC WITH AUTOMATED DIFF    Collection Time: 05/06/20  1:52 PM   Result Value Ref Range    WBC 6.9 3.6 - 11.0 K/uL    RBC 4.39 3.80 - 5.20 M/uL    HGB 12.6 11.5 - 16.0 g/dL    HCT 40.4 35.0 - 47.0 %    MCV 92.0 80.0 - 99.0 FL    MCH 28.7 26.0 - 34.0 PG    MCHC 31.2 30.0 - 36.5 g/dL    RDW 14.2 11.5 - 14.5 %    PLATELET 721 326 - 250 K/uL    NRBC 0.0 0  WBC    ABSOLUTE NRBC 0.00 0.00 - 0.01 K/uL    NEUTROPHILS 76 (H) 32 - 75 %    LYMPHOCYTES 14 12 - 49 %    MONOCYTES 8 5 - 13 %    EOSINOPHILS 1 0 - 7 %    BASOPHILS 1 0 - 1 %    IMMATURE GRANULOCYTES 0 0.0 - 0.5 %    ABS. NEUTROPHILS 5.2 1.8 - 8.0 K/UL    ABS. LYMPHOCYTES 1.0 0.8 - 3.5 K/UL    ABS. MONOCYTES 0.6 0.0 - 1.0 K/UL    ABS. EOSINOPHILS 0.0 0.0 - 0.4 K/UL    ABS. BASOPHILS 0.0 0.0 - 0.1 K/UL    ABS. IMM. GRANS. 0.0 0.00 - 0.04 K/UL    DF AUTOMATED     METABOLIC PANEL, COMPREHENSIVE    Collection Time: 05/06/20  1:52 PM   Result Value Ref Range    Sodium 141 136 - 145 mmol/L    Potassium 4.7 3.5 - 5.1 mmol/L    Chloride 106 97 - 108 mmol/L    CO2 28 21 - 32 mmol/L    Anion gap 7 5 - 15 mmol/L    Glucose 208 (H) 65 - 100 mg/dL    BUN 16 6 - 20 MG/DL    Creatinine 0.80 0.55 - 1.02 MG/DL    BUN/Creatinine ratio 20 12 - 20      GFR est AA >60 >60 ml/min/1.73m2    GFR est non-AA >60 >60 ml/min/1.73m2    Calcium 9.5 8.5 - 10.1 MG/DL    Bilirubin, total 0.4 0.2 - 1.0 MG/DL    ALT (SGPT) 16 12 - 78 U/L    AST (SGOT) 15 15 - 37 U/L    Alk.  phosphatase 88 45 - 117 U/L    Protein, total 6.9 6.4 - 8.2 g/dL    Albumin 3.5 3.5 - 5.0 g/dL    Globulin 3.4 2.0 - 4.0 g/dL    A-G Ratio 1.0 (L) 1.1 - 2. 2     HEMOGLOBIN A1C WITH EAG    Collection Time: 05/06/20  1:52 PM   Result Value Ref Range    Hemoglobin A1c 5.9 (H) 4.0 - 5.6 %    Est. average glucose 123 mg/dL   CULTURE, MRSA    Collection Time: 05/06/20  1:52 PM   Result Value Ref Range    Special Requests: NO SPECIAL REQUESTS      Culture result: MRSA NOT PRESENT  AT 16 HOURS     PROTHROMBIN TIME + INR    Collection Time: 05/06/20  1:52 PM   Result Value Ref Range    INR 1.1 0.9 - 1.1      Prothrombin time 11.0 9.0 - 11.1 sec   PTT    Collection Time: 05/06/20  1:52 PM   Result Value Ref Range    aPTT 24.8 22.1 - 32.0 sec    aPTT, therapeutic range     58.0 - 77.0 SECS   URINALYSIS W/ REFLEX CULTURE    Collection Time: 05/06/20  1:52 PM   Result Value Ref Range    Color YELLOW/STRAW      Appearance CLEAR CLEAR      Specific gravity 1.008 1.003 - 1.030      pH (UA) 6.5 5.0 - 8.0      Protein Negative NEG mg/dL    Glucose Negative NEG mg/dL    Ketone Negative NEG mg/dL    Bilirubin Negative NEG      Blood Negative NEG      Urobilinogen 0.2 0.2 - 1.0 EU/dL    Nitrites Negative NEG      Leukocyte Esterase Negative NEG      WBC 0-4 0 - 4 /hpf    RBC 0-5 0 - 5 /hpf    Epithelial cells FEW FEW /lpf    Bacteria Negative NEG /hpf    UA:UC IF INDICATED CULTURE NOT INDICATED BY UA RESULT CNI     VITAMIN D, 25 HYDROXY    Collection Time: 05/06/20  1:52 PM   Result Value Ref Range    Vitamin D 25-Hydroxy 38.1 30 - 100 ng/mL   TYPE & SCREEN    Collection Time: 05/06/20  1:53 PM   Result Value Ref Range    Crossmatch Expiration 05/14/2020     ABO/Rh(D) A POSITIVE     Antibody screen NEG        Assessment and Plan     Assessment/Plan:   1) Failed lumbar laminectomy  2) Pre-Operative Evaluation    Labs and EKG reviewed. MRSA pending    HTN: As before, when she is in a health care situation her BP is uncontrolled.  She brought in her new BP readings for this month which were all normal.     Preoperative Clearance  Per RCRI, the patient has a 0.4% risk of cardiac death, nonfatal MI, nonfatal cardiac arrest based on no risk factors. Per ACC/AHA guidelines, patient is low risk for a(n) intermediate risk surgery and may proceed to planned surgery with the above noted risk.     Andrea Martinez NP

## 2020-05-08 ENCOUNTER — HOSPITAL ENCOUNTER (OUTPATIENT)
Dept: PREADMISSION TESTING | Age: 75
Discharge: HOME | DRG: 520 | End: 2020-05-08
Payer: MEDICARE

## 2020-05-08 PROCEDURE — 87635 SARS-COV-2 COVID-19 AMP PRB: CPT

## 2020-05-08 RX ORDER — MUPIROCIN 20 MG/G
OINTMENT TOPICAL 2 TIMES DAILY
Qty: 22 G | Refills: 0 | Status: SHIPPED | OUTPATIENT
Start: 2020-05-08 | End: 2020-05-13

## 2020-05-09 ENCOUNTER — ANESTHESIA EVENT (OUTPATIENT)
Dept: SURGERY | Age: 75
DRG: 520 | End: 2020-05-09
Payer: MEDICARE

## 2020-05-10 LAB — SARS-COV-2, COV2NT: NOT DETECTED

## 2020-05-11 ENCOUNTER — HOSPITAL ENCOUNTER (INPATIENT)
Age: 75
LOS: 2 days | Discharge: HOME HEALTH | DRG: 520 | End: 2020-05-13
Attending: ORTHOPAEDIC SURGERY | Admitting: ORTHOPAEDIC SURGERY
Payer: MEDICARE

## 2020-05-11 ENCOUNTER — ANESTHESIA (OUTPATIENT)
Dept: SURGERY | Age: 75
DRG: 520 | End: 2020-05-11
Payer: MEDICARE

## 2020-05-11 ENCOUNTER — APPOINTMENT (OUTPATIENT)
Dept: GENERAL RADIOLOGY | Age: 75
DRG: 520 | End: 2020-05-11
Attending: ORTHOPAEDIC SURGERY
Payer: MEDICARE

## 2020-05-11 DIAGNOSIS — G89.18 POST-OP PAIN: Primary | ICD-10-CM

## 2020-05-11 PROCEDURE — 74011250636 HC RX REV CODE- 250/636: Performed by: ORTHOPAEDIC SURGERY

## 2020-05-11 PROCEDURE — 65270000029 HC RM PRIVATE

## 2020-05-11 PROCEDURE — 77030029099 HC BN WAX SSPC -A: Performed by: ORTHOPAEDIC SURGERY

## 2020-05-11 PROCEDURE — 77030008684 HC TU ET CUF COVD -B: Performed by: ANESTHESIOLOGY

## 2020-05-11 PROCEDURE — 77030002933 HC SUT MCRYL J&J -A: Performed by: ORTHOPAEDIC SURGERY

## 2020-05-11 PROCEDURE — 77030002982 HC SUT POLYSRB J&J -A: Performed by: ORTHOPAEDIC SURGERY

## 2020-05-11 PROCEDURE — 77030013708 HC HNDPC SUC IRR PULS STRY –B: Performed by: ORTHOPAEDIC SURGERY

## 2020-05-11 PROCEDURE — 74011000250 HC RX REV CODE- 250: Performed by: ORTHOPAEDIC SURGERY

## 2020-05-11 PROCEDURE — 74011250636 HC RX REV CODE- 250/636: Performed by: NURSE ANESTHETIST, CERTIFIED REGISTERED

## 2020-05-11 PROCEDURE — 77030040361 HC SLV COMPR DVT MDII -B

## 2020-05-11 PROCEDURE — 77030039267 HC ADH SKN EXOFIN S2SG -B: Performed by: ORTHOPAEDIC SURGERY

## 2020-05-11 PROCEDURE — 77030031139 HC SUT VCRL2 J&J -A: Performed by: ORTHOPAEDIC SURGERY

## 2020-05-11 PROCEDURE — 00NY0ZZ RELEASE LUMBAR SPINAL CORD, OPEN APPROACH: ICD-10-PCS | Performed by: ORTHOPAEDIC SURGERY

## 2020-05-11 PROCEDURE — 74011250636 HC RX REV CODE- 250/636: Performed by: STUDENT IN AN ORGANIZED HEALTH CARE EDUCATION/TRAINING PROGRAM

## 2020-05-11 PROCEDURE — 77030020061 HC IV BLD WRMR ADMIN SET 3M -B: Performed by: ANESTHESIOLOGY

## 2020-05-11 PROCEDURE — 76210000017 HC OR PH I REC 1.5 TO 2 HR: Performed by: ORTHOPAEDIC SURGERY

## 2020-05-11 PROCEDURE — 77030004391 HC BUR FLUT MEDT -C: Performed by: ORTHOPAEDIC SURGERY

## 2020-05-11 PROCEDURE — 74011250637 HC RX REV CODE- 250/637: Performed by: PHYSICIAN ASSISTANT

## 2020-05-11 PROCEDURE — 94760 N-INVAS EAR/PLS OXIMETRY 1: CPT

## 2020-05-11 PROCEDURE — 74011000258 HC RX REV CODE- 258: Performed by: NURSE ANESTHETIST, CERTIFIED REGISTERED

## 2020-05-11 PROCEDURE — 74011000272 HC RX REV CODE- 272: Performed by: ORTHOPAEDIC SURGERY

## 2020-05-11 PROCEDURE — 0SP00AZ REMOVAL OF INTERBODY FUSION DEVICE FROM LUMBAR VERTEBRAL JOINT, OPEN APPROACH: ICD-10-PCS | Performed by: ORTHOPAEDIC SURGERY

## 2020-05-11 PROCEDURE — 77030003666 HC NDL SPINAL BD -A: Performed by: ORTHOPAEDIC SURGERY

## 2020-05-11 PROCEDURE — 77030014650 HC SEAL MTRX FLOSEL BAXT -C: Performed by: ORTHOPAEDIC SURGERY

## 2020-05-11 PROCEDURE — 77030040356 HC CORD BPLR FRCP COVD -A: Performed by: ORTHOPAEDIC SURGERY

## 2020-05-11 PROCEDURE — 77030005513 HC CATH URETH FOL11 MDII -B: Performed by: ORTHOPAEDIC SURGERY

## 2020-05-11 PROCEDURE — 74011000250 HC RX REV CODE- 250: Performed by: NURSE ANESTHETIST, CERTIFIED REGISTERED

## 2020-05-11 PROCEDURE — 77030040922 HC BLNKT HYPOTHRM STRY -A

## 2020-05-11 PROCEDURE — 76010000173 HC OR TIME 3 TO 3.5 HR INTENSV-TIER 1: Performed by: ORTHOPAEDIC SURGERY

## 2020-05-11 PROCEDURE — 77030026438 HC STYL ET INTUB CARD -A: Performed by: ANESTHESIOLOGY

## 2020-05-11 PROCEDURE — 77030013079 HC BLNKT BAIR HGGR 3M -A: Performed by: ANESTHESIOLOGY

## 2020-05-11 PROCEDURE — 77030033067 HC SUT PDO STRATFX SPIR J&J -B: Performed by: ORTHOPAEDIC SURGERY

## 2020-05-11 PROCEDURE — 77030018846 HC SOL IRR STRL H20 ICUM -A: Performed by: ORTHOPAEDIC SURGERY

## 2020-05-11 PROCEDURE — 01NB0ZZ RELEASE LUMBAR NERVE, OPEN APPROACH: ICD-10-PCS | Performed by: ORTHOPAEDIC SURGERY

## 2020-05-11 PROCEDURE — 77030003161 HC GRFT DURA MTRX INLC -E: Performed by: ORTHOPAEDIC SURGERY

## 2020-05-11 PROCEDURE — 77030018723 HC ELCTRD BLD COVD -A: Performed by: ORTHOPAEDIC SURGERY

## 2020-05-11 PROCEDURE — 77030038552 HC DRN WND MDII -A: Performed by: ORTHOPAEDIC SURGERY

## 2020-05-11 PROCEDURE — 77030019908 HC STETH ESOPH SIMS -A: Performed by: ANESTHESIOLOGY

## 2020-05-11 PROCEDURE — 74011250637 HC RX REV CODE- 250/637: Performed by: ORTHOPAEDIC SURGERY

## 2020-05-11 PROCEDURE — 76060000037 HC ANESTHESIA 3 TO 3.5 HR: Performed by: ORTHOPAEDIC SURGERY

## 2020-05-11 DEVICE — DURAGEN® SUTURABLE DURAL REGENERATION MATRIX, 2 IN X 2 IN (5 CM X 5 CM)
Type: IMPLANTABLE DEVICE | Site: SPINE LUMBAR | Status: FUNCTIONAL
Brand: DURAGEN® SUTURABLE

## 2020-05-11 RX ORDER — ATORVASTATIN CALCIUM 20 MG/1
20 TABLET, FILM COATED ORAL EVERY EVENING
Status: DISCONTINUED | OUTPATIENT
Start: 2020-05-11 | End: 2020-05-13 | Stop reason: HOSPADM

## 2020-05-11 RX ORDER — DIPHENHYDRAMINE HYDROCHLORIDE 50 MG/ML
12.5 INJECTION, SOLUTION INTRAMUSCULAR; INTRAVENOUS
Status: DISCONTINUED | OUTPATIENT
Start: 2020-05-11 | End: 2020-05-13 | Stop reason: HOSPADM

## 2020-05-11 RX ORDER — PANTOPRAZOLE SODIUM 40 MG/1
40 TABLET, DELAYED RELEASE ORAL
Status: DISCONTINUED | OUTPATIENT
Start: 2020-05-12 | End: 2020-05-13 | Stop reason: HOSPADM

## 2020-05-11 RX ORDER — HYDROMORPHONE HCL/0.9% NACL/PF 0.5 MG/ML
PLASTIC BAG, INJECTION (ML) INTRAVENOUS
Status: DISPENSED | OUTPATIENT
Start: 2020-05-11 | End: 2020-05-12

## 2020-05-11 RX ORDER — SODIUM CHLORIDE, SODIUM LACTATE, POTASSIUM CHLORIDE, CALCIUM CHLORIDE 600; 310; 30; 20 MG/100ML; MG/100ML; MG/100ML; MG/100ML
125 INJECTION, SOLUTION INTRAVENOUS CONTINUOUS
Status: DISCONTINUED | OUTPATIENT
Start: 2020-05-11 | End: 2020-05-11 | Stop reason: HOSPADM

## 2020-05-11 RX ORDER — SUCCINYLCHOLINE CHLORIDE 20 MG/ML
INJECTION INTRAMUSCULAR; INTRAVENOUS AS NEEDED
Status: DISCONTINUED | OUTPATIENT
Start: 2020-05-11 | End: 2020-05-11 | Stop reason: HOSPADM

## 2020-05-11 RX ORDER — HYDROMORPHONE HYDROCHLORIDE 1 MG/ML
.5-1 INJECTION, SOLUTION INTRAMUSCULAR; INTRAVENOUS; SUBCUTANEOUS
Status: DISCONTINUED | OUTPATIENT
Start: 2020-05-11 | End: 2020-05-11 | Stop reason: HOSPADM

## 2020-05-11 RX ORDER — DEXAMETHASONE SODIUM PHOSPHATE 4 MG/ML
INJECTION, SOLUTION INTRA-ARTICULAR; INTRALESIONAL; INTRAMUSCULAR; INTRAVENOUS; SOFT TISSUE AS NEEDED
Status: DISCONTINUED | OUTPATIENT
Start: 2020-05-11 | End: 2020-05-11 | Stop reason: HOSPADM

## 2020-05-11 RX ORDER — FACIAL-BODY WIPES
10 EACH TOPICAL DAILY PRN
Status: DISCONTINUED | OUTPATIENT
Start: 2020-05-13 | End: 2020-05-13 | Stop reason: HOSPADM

## 2020-05-11 RX ORDER — MIDAZOLAM HYDROCHLORIDE 1 MG/ML
INJECTION, SOLUTION INTRAMUSCULAR; INTRAVENOUS AS NEEDED
Status: DISCONTINUED | OUTPATIENT
Start: 2020-05-11 | End: 2020-05-11 | Stop reason: HOSPADM

## 2020-05-11 RX ORDER — OXYCODONE HYDROCHLORIDE 5 MG/1
10 TABLET ORAL
Status: DISCONTINUED | OUTPATIENT
Start: 2020-05-11 | End: 2020-05-13 | Stop reason: HOSPADM

## 2020-05-11 RX ORDER — LIDOCAINE HYDROCHLORIDE 20 MG/ML
INJECTION, SOLUTION EPIDURAL; INFILTRATION; INTRACAUDAL; PERINEURAL AS NEEDED
Status: DISCONTINUED | OUTPATIENT
Start: 2020-05-11 | End: 2020-05-11

## 2020-05-11 RX ORDER — AMOXICILLIN 250 MG
1 CAPSULE ORAL 2 TIMES DAILY
Status: DISCONTINUED | OUTPATIENT
Start: 2020-05-11 | End: 2020-05-13 | Stop reason: HOSPADM

## 2020-05-11 RX ORDER — POLYETHYLENE GLYCOL 3350 17 G/17G
17 POWDER, FOR SOLUTION ORAL DAILY
Status: DISCONTINUED | OUTPATIENT
Start: 2020-05-12 | End: 2020-05-13 | Stop reason: HOSPADM

## 2020-05-11 RX ORDER — VANCOMYCIN HYDROCHLORIDE 1 G/20ML
INJECTION, POWDER, LYOPHILIZED, FOR SOLUTION INTRAVENOUS AS NEEDED
Status: DISCONTINUED | OUTPATIENT
Start: 2020-05-11 | End: 2020-05-11 | Stop reason: HOSPADM

## 2020-05-11 RX ORDER — PROPOFOL 10 MG/ML
INJECTION, EMULSION INTRAVENOUS
Status: DISCONTINUED | OUTPATIENT
Start: 2020-05-11 | End: 2020-05-11 | Stop reason: HOSPADM

## 2020-05-11 RX ORDER — PROPOFOL 10 MG/ML
INJECTION, EMULSION INTRAVENOUS
Status: DISCONTINUED | OUTPATIENT
Start: 2020-05-11 | End: 2020-05-11

## 2020-05-11 RX ORDER — EPHEDRINE SULFATE/0.9% NACL/PF 50 MG/5 ML
SYRINGE (ML) INTRAVENOUS AS NEEDED
Status: DISCONTINUED | OUTPATIENT
Start: 2020-05-11 | End: 2020-05-11 | Stop reason: HOSPADM

## 2020-05-11 RX ORDER — NALOXONE HYDROCHLORIDE 0.4 MG/ML
0.4 INJECTION, SOLUTION INTRAMUSCULAR; INTRAVENOUS; SUBCUTANEOUS AS NEEDED
Status: DISCONTINUED | OUTPATIENT
Start: 2020-05-11 | End: 2020-05-13 | Stop reason: HOSPADM

## 2020-05-11 RX ORDER — ONDANSETRON 2 MG/ML
4 INJECTION INTRAMUSCULAR; INTRAVENOUS AS NEEDED
Status: DISCONTINUED | OUTPATIENT
Start: 2020-05-11 | End: 2020-05-11 | Stop reason: HOSPADM

## 2020-05-11 RX ORDER — LOSARTAN POTASSIUM 50 MG/1
50 TABLET ORAL
Status: DISCONTINUED | OUTPATIENT
Start: 2020-05-11 | End: 2020-05-13 | Stop reason: HOSPADM

## 2020-05-11 RX ORDER — ACETAMINOPHEN 325 MG/1
650 TABLET ORAL
Status: DISCONTINUED | OUTPATIENT
Start: 2020-05-11 | End: 2020-05-13 | Stop reason: HOSPADM

## 2020-05-11 RX ORDER — SODIUM CHLORIDE 9 MG/ML
125 INJECTION, SOLUTION INTRAVENOUS CONTINUOUS
Status: DISPENSED | OUTPATIENT
Start: 2020-05-11 | End: 2020-05-12

## 2020-05-11 RX ORDER — LABETALOL HYDROCHLORIDE 5 MG/ML
INJECTION, SOLUTION INTRAVENOUS AS NEEDED
Status: DISCONTINUED | OUTPATIENT
Start: 2020-05-11 | End: 2020-05-11 | Stop reason: HOSPADM

## 2020-05-11 RX ORDER — LIDOCAINE HYDROCHLORIDE 10 MG/ML
0.1 INJECTION, SOLUTION EPIDURAL; INFILTRATION; INTRACAUDAL; PERINEURAL AS NEEDED
Status: DISCONTINUED | OUTPATIENT
Start: 2020-05-11 | End: 2020-05-11 | Stop reason: HOSPADM

## 2020-05-11 RX ORDER — VECURONIUM BROMIDE FOR INJECTION 1 MG/ML
INJECTION, POWDER, LYOPHILIZED, FOR SOLUTION INTRAVENOUS AS NEEDED
Status: DISCONTINUED | OUTPATIENT
Start: 2020-05-11 | End: 2020-05-11 | Stop reason: HOSPADM

## 2020-05-11 RX ORDER — ONDANSETRON 2 MG/ML
4 INJECTION INTRAMUSCULAR; INTRAVENOUS
Status: ACTIVE | OUTPATIENT
Start: 2020-05-11 | End: 2020-05-12

## 2020-05-11 RX ORDER — LIDOCAINE HYDROCHLORIDE 20 MG/ML
INJECTION, SOLUTION EPIDURAL; INFILTRATION; INTRACAUDAL; PERINEURAL AS NEEDED
Status: DISCONTINUED | OUTPATIENT
Start: 2020-05-11 | End: 2020-05-11 | Stop reason: HOSPADM

## 2020-05-11 RX ORDER — CHOLECALCIFEROL TAB 125 MCG (5000 UNIT) 125 MCG
5000 TAB ORAL DAILY
Status: DISCONTINUED | OUTPATIENT
Start: 2020-05-12 | End: 2020-05-13 | Stop reason: HOSPADM

## 2020-05-11 RX ORDER — SODIUM CHLORIDE 0.9 % (FLUSH) 0.9 %
5-40 SYRINGE (ML) INJECTION EVERY 8 HOURS
Status: DISCONTINUED | OUTPATIENT
Start: 2020-05-11 | End: 2020-05-13 | Stop reason: HOSPADM

## 2020-05-11 RX ORDER — KETAMINE HYDROCHLORIDE 10 MG/ML
INJECTION, SOLUTION INTRAMUSCULAR; INTRAVENOUS AS NEEDED
Status: DISCONTINUED | OUTPATIENT
Start: 2020-05-11 | End: 2020-05-11 | Stop reason: HOSPADM

## 2020-05-11 RX ORDER — FENTANYL CITRATE 50 UG/ML
INJECTION, SOLUTION INTRAMUSCULAR; INTRAVENOUS AS NEEDED
Status: DISCONTINUED | OUTPATIENT
Start: 2020-05-11 | End: 2020-05-11 | Stop reason: HOSPADM

## 2020-05-11 RX ORDER — OXYCODONE HYDROCHLORIDE 5 MG/1
5 TABLET ORAL
Status: DISCONTINUED | OUTPATIENT
Start: 2020-05-11 | End: 2020-05-13 | Stop reason: HOSPADM

## 2020-05-11 RX ORDER — SODIUM CHLORIDE 0.9 % (FLUSH) 0.9 %
5-40 SYRINGE (ML) INJECTION AS NEEDED
Status: DISCONTINUED | OUTPATIENT
Start: 2020-05-11 | End: 2020-05-13 | Stop reason: HOSPADM

## 2020-05-11 RX ORDER — MUPIROCIN 20 MG/G
OINTMENT TOPICAL 2 TIMES DAILY
Status: DISCONTINUED | OUTPATIENT
Start: 2020-05-11 | End: 2020-05-13 | Stop reason: HOSPADM

## 2020-05-11 RX ORDER — ONDANSETRON 2 MG/ML
INJECTION INTRAMUSCULAR; INTRAVENOUS AS NEEDED
Status: DISCONTINUED | OUTPATIENT
Start: 2020-05-11 | End: 2020-05-11 | Stop reason: HOSPADM

## 2020-05-11 RX ORDER — LIOTHYRONINE SODIUM 5 UG/1
5 TABLET ORAL
Status: DISCONTINUED | OUTPATIENT
Start: 2020-05-12 | End: 2020-05-13 | Stop reason: HOSPADM

## 2020-05-11 RX ORDER — LEVOTHYROXINE SODIUM 112 UG/1
112 TABLET ORAL
Status: DISCONTINUED | OUTPATIENT
Start: 2020-05-12 | End: 2020-05-13 | Stop reason: HOSPADM

## 2020-05-11 RX ORDER — PROPOFOL 10 MG/ML
INJECTION, EMULSION INTRAVENOUS AS NEEDED
Status: DISCONTINUED | OUTPATIENT
Start: 2020-05-11 | End: 2020-05-11 | Stop reason: HOSPADM

## 2020-05-11 RX ORDER — GABAPENTIN 100 MG/1
100 CAPSULE ORAL 3 TIMES DAILY
Status: DISCONTINUED | OUTPATIENT
Start: 2020-05-11 | End: 2020-05-13 | Stop reason: HOSPADM

## 2020-05-11 RX ORDER — HYDROMORPHONE HYDROCHLORIDE 1 MG/ML
0.5 INJECTION, SOLUTION INTRAMUSCULAR; INTRAVENOUS; SUBCUTANEOUS
Status: ACTIVE | OUTPATIENT
Start: 2020-05-11 | End: 2020-05-12

## 2020-05-11 RX ORDER — CYCLOBENZAPRINE HCL 10 MG
10 TABLET ORAL
Status: DISCONTINUED | OUTPATIENT
Start: 2020-05-11 | End: 2020-05-13 | Stop reason: HOSPADM

## 2020-05-11 RX ADMIN — LABETALOL HYDROCHLORIDE 5 MG: 5 INJECTION INTRAVENOUS at 14:28

## 2020-05-11 RX ADMIN — KETAMINE HYDROCHLORIDE 10 MG: 10 INJECTION INTRAMUSCULAR; INTRAVENOUS at 12:40

## 2020-05-11 RX ADMIN — Medication 15 MG: at 11:46

## 2020-05-11 RX ADMIN — Medication: at 16:03

## 2020-05-11 RX ADMIN — Medication 10 MG: at 12:11

## 2020-05-11 RX ADMIN — SODIUM CHLORIDE, SODIUM LACTATE, POTASSIUM CHLORIDE, AND CALCIUM CHLORIDE 125 ML/HR: 600; 310; 30; 20 INJECTION, SOLUTION INTRAVENOUS at 09:46

## 2020-05-11 RX ADMIN — VECURONIUM BROMIDE 3 MG: 1 INJECTION, POWDER, LYOPHILIZED, FOR SOLUTION INTRAVENOUS at 11:39

## 2020-05-11 RX ADMIN — FENTANYL CITRATE 100 MCG: 50 INJECTION, SOLUTION INTRAMUSCULAR; INTRAVENOUS at 12:01

## 2020-05-11 RX ADMIN — CEFAZOLIN SODIUM 2 G: 1 POWDER, FOR SOLUTION INTRAMUSCULAR; INTRAVENOUS at 12:03

## 2020-05-11 RX ADMIN — KETAMINE HYDROCHLORIDE 10 MG: 10 INJECTION INTRAMUSCULAR; INTRAVENOUS at 13:24

## 2020-05-11 RX ADMIN — GABAPENTIN 100 MG: 100 CAPSULE ORAL at 22:04

## 2020-05-11 RX ADMIN — LOSARTAN POTASSIUM 50 MG: 50 TABLET ORAL at 22:04

## 2020-05-11 RX ADMIN — HYDROMORPHONE HYDROCHLORIDE 0.5 MG: 1 INJECTION, SOLUTION INTRAMUSCULAR; INTRAVENOUS; SUBCUTANEOUS at 15:48

## 2020-05-11 RX ADMIN — FENTANYL CITRATE 50 MCG: 50 INJECTION, SOLUTION INTRAMUSCULAR; INTRAVENOUS at 12:33

## 2020-05-11 RX ADMIN — LIDOCAINE HYDROCHLORIDE 10 MG: 20 INJECTION, SOLUTION INTRAVENOUS at 13:24

## 2020-05-11 RX ADMIN — Medication 10 ML: at 18:00

## 2020-05-11 RX ADMIN — PROPOFOL 30 MG: 10 INJECTION, EMULSION INTRAVENOUS at 12:33

## 2020-05-11 RX ADMIN — KETAMINE HYDROCHLORIDE 10 MG: 10 INJECTION INTRAMUSCULAR; INTRAVENOUS at 12:22

## 2020-05-11 RX ADMIN — PROPOFOL 75 MCG/KG/MIN: 10 INJECTION, EMULSION INTRAVENOUS at 12:00

## 2020-05-11 RX ADMIN — DOCUSATE SODIUM 50MG AND SENNOSIDES 8.6MG 1 TABLET: 8.6; 5 TABLET, FILM COATED ORAL at 17:50

## 2020-05-11 RX ADMIN — HYDROMORPHONE HYDROCHLORIDE 0.5 MG: 1 INJECTION, SOLUTION INTRAMUSCULAR; INTRAVENOUS; SUBCUTANEOUS at 15:33

## 2020-05-11 RX ADMIN — FENTANYL CITRATE 25 MCG: 50 INJECTION, SOLUTION INTRAMUSCULAR; INTRAVENOUS at 14:03

## 2020-05-11 RX ADMIN — PHENYLEPHRINE HYDROCHLORIDE 160 MCG: 10 INJECTION INTRAVENOUS at 12:59

## 2020-05-11 RX ADMIN — MIDAZOLAM HYDROCHLORIDE 2 MG: 2 INJECTION, SOLUTION INTRAMUSCULAR; INTRAVENOUS at 11:34

## 2020-05-11 RX ADMIN — WATER 2 G: 1 INJECTION INTRAMUSCULAR; INTRAVENOUS; SUBCUTANEOUS at 17:50

## 2020-05-11 RX ADMIN — KETAMINE HYDROCHLORIDE 10 MG: 10 INJECTION INTRAMUSCULAR; INTRAVENOUS at 13:40

## 2020-05-11 RX ADMIN — KETAMINE HYDROCHLORIDE 10 MG: 10 INJECTION INTRAMUSCULAR; INTRAVENOUS at 13:12

## 2020-05-11 RX ADMIN — Medication 10 ML: at 22:06

## 2020-05-11 RX ADMIN — HYDROMORPHONE HYDROCHLORIDE 0.5 MG: 1 INJECTION, SOLUTION INTRAMUSCULAR; INTRAVENOUS; SUBCUTANEOUS at 16:33

## 2020-05-11 RX ADMIN — KETAMINE HYDROCHLORIDE 20 MG: 10 INJECTION INTRAMUSCULAR; INTRAVENOUS at 11:36

## 2020-05-11 RX ADMIN — LIDOCAINE HYDROCHLORIDE 10 MG: 20 INJECTION, SOLUTION INTRAVENOUS at 12:56

## 2020-05-11 RX ADMIN — FENTANYL CITRATE 25 MCG: 50 INJECTION, SOLUTION INTRAMUSCULAR; INTRAVENOUS at 14:23

## 2020-05-11 RX ADMIN — PHENYLEPHRINE HYDROCHLORIDE 160 MCG: 10 INJECTION INTRAVENOUS at 12:44

## 2020-05-11 RX ADMIN — SODIUM CHLORIDE, SODIUM LACTATE, POTASSIUM CHLORIDE, AND CALCIUM CHLORIDE: 600; 310; 30; 20 INJECTION, SOLUTION INTRAVENOUS at 13:59

## 2020-05-11 RX ADMIN — ONDANSETRON 4 MG: 2 INJECTION INTRAMUSCULAR; INTRAVENOUS at 13:32

## 2020-05-11 RX ADMIN — SODIUM CHLORIDE, SODIUM LACTATE, POTASSIUM CHLORIDE, AND CALCIUM CHLORIDE: 600; 310; 30; 20 INJECTION, SOLUTION INTRAVENOUS at 11:44

## 2020-05-11 RX ADMIN — FENTANYL CITRATE 100 MCG: 50 INJECTION, SOLUTION INTRAMUSCULAR; INTRAVENOUS at 11:40

## 2020-05-11 RX ADMIN — LIDOCAINE HYDROCHLORIDE 10 MG: 20 INJECTION, SOLUTION INTRAVENOUS at 12:22

## 2020-05-11 RX ADMIN — Medication 10 MG: at 12:20

## 2020-05-11 RX ADMIN — ATORVASTATIN CALCIUM 20 MG: 20 TABLET, FILM COATED ORAL at 17:50

## 2020-05-11 RX ADMIN — LIDOCAINE HYDROCHLORIDE 10 MG: 20 INJECTION, SOLUTION INTRAVENOUS at 13:40

## 2020-05-11 RX ADMIN — FENTANYL CITRATE 25 MCG: 50 INJECTION, SOLUTION INTRAMUSCULAR; INTRAVENOUS at 14:44

## 2020-05-11 RX ADMIN — SODIUM CHLORIDE 125 ML/HR: 900 INJECTION, SOLUTION INTRAVENOUS at 16:16

## 2020-05-11 RX ADMIN — FENTANYL CITRATE 25 MCG: 50 INJECTION, SOLUTION INTRAMUSCULAR; INTRAVENOUS at 14:00

## 2020-05-11 RX ADMIN — PROPOFOL 150 MG: 10 INJECTION, EMULSION INTRAVENOUS at 11:40

## 2020-05-11 RX ADMIN — KETAMINE HYDROCHLORIDE 10 MG: 10 INJECTION INTRAMUSCULAR; INTRAVENOUS at 12:56

## 2020-05-11 RX ADMIN — VECURONIUM BROMIDE 3 MG: 1 INJECTION, POWDER, LYOPHILIZED, FOR SOLUTION INTRAVENOUS at 11:51

## 2020-05-11 RX ADMIN — SUCCINYLCHOLINE CHLORIDE 140 MG: 20 INJECTION, SOLUTION INTRAMUSCULAR; INTRAVENOUS; PARENTERAL at 11:40

## 2020-05-11 RX ADMIN — LIDOCAINE HYDROCHLORIDE 10 MG: 20 INJECTION, SOLUTION INTRAVENOUS at 12:40

## 2020-05-11 RX ADMIN — LIDOCAINE HYDROCHLORIDE 10 MG: 20 INJECTION, SOLUTION INTRAVENOUS at 13:12

## 2020-05-11 RX ADMIN — DEXAMETHASONE SODIUM PHOSPHATE 4 MG: 4 INJECTION, SOLUTION INTRAMUSCULAR; INTRAVENOUS at 11:51

## 2020-05-11 RX ADMIN — MUPIROCIN: 20 OINTMENT TOPICAL at 20:09

## 2020-05-11 NOTE — PROGRESS NOTES
Problem: Falls - Risk of  Goal: *Absence of Falls  Description: Document Marely Youssef Fall Risk and appropriate interventions in the flowsheet.   5/11/2020 1819 by Anel Ventura RN  Outcome: Progressing Towards Goal  Note: Fall Risk Interventions:  Mobility Interventions: Communicate number of staff needed for ambulation/transfer, Patient to call before getting OOB    Medication Interventions: Patient to call before getting OOB, Teach patient to arise slowly    Elimination Interventions: Call light in reach    5/11/2020 1735 by Anel Ventura RN  Outcome: Progressing Towards Goal  Note: Fall Risk Interventions:

## 2020-05-11 NOTE — PERIOP NOTES
TRANSFER - OUT REPORT:    Verbal report given to Emely Frye RN(name) on Harris Blas  being transferred to [facility](unit) for routine post - op       Report consisted of patients Situation, Background, Assessment and   Recommendations(SBAR). Information from the following report(s) SBAR, OR Summary, Procedure Summary, Intake/Output and MAR was reviewed with the receiving nurse. Opportunity for questions and clarification was provided.       Patient transported with:   Registered Nurse

## 2020-05-11 NOTE — OP NOTES
[address] [facility] [place], [address] [facility]    OPERATIVE REPORT      NAME: Alisa Galan    AGE: 67 y.o. YOB: 1945    MEDICAL RECORD NUMBER: [id number]    DATE OF SURGERY: 5/11/2020    PREOPERATIVE DIAGNOSIS: Lumbar stenosis    POSTOPERATIVE DIAGNOSIS: Lumbar stenosis    OPERATIVE PROCEDURE:   1. Left L5-S1 miya-laminotomy, partial facetectomy, and foraminotomy  2. Revision right L4-L5 miya-laminotomy and partial facetectomy  3. Removal of posterior instrumentation from L4-L5.     SURGEON: Alton Rehman MD      ASSISTANT: VALENTIN Piper     ANESTHESIA: General      ESTIMATED BLOOD LOSS: 711 cc     COMPLICATIONS: None apparent     Specimens and Implants - none     INDICATION FOR PROCEDURE: The patient is a very pleasant 67 y.o. female with debilitating back pain, status post previous posterior fusion. She elected to proceed with operative intervention. She was aware of the risks, benefits, and alternatives. She provided informed consent. PROCEDURE: The patient was identified in the preoperative holding area. The lumbar spine was marked by me. She was transferred to the operating room where general anesthesia was given. She was also given perioperative ancef antibiotics. She was placed prone on the Radhames frame. All bony prominences were well padded. The lumbar spine was prepped and draped in the usual standard fashion. We performed a surgical timeout. I made a skin incision over L4-S1. I exposed in standard fashion with electrocautery. I placed retractors and obtained intraoperative fluoroscopy to verify our levels. I brought in the microscope. I then performed a left-sided hemilaminotomy with the high-speed bur of L5 and S1. I excised the ligamentum flavum to expose the neurologic elements. I performed a partial medial facetectomy and foraminotomy on the left side.  The nerve root and thecal sac were decompressed and under no undue tension. I then performed a revision right L4-L5 miya-laminotomy with partial facetectomy to decompress the thecal sac and nerve root. I removed the posterior interbody spacer that had migrated to the epidural space on the right side in standard fashion. .    I copiously irrigated the entire wound. We had good hemostasis. There was no CSF leaking under microscopic inspection. The soft tissues were injected with 0.5% Marcaine. We closed the wound in several layers. A sterile dressing was applied. The patient was extubated and transferred to the recovery room in good medical condition. The PA assisted with retraction and closure. I, Dr. Timothy Su, performed the above procedures.      Timothy Su MD  5/11/2020

## 2020-05-11 NOTE — H&P
Date of Surgery Update:  Reynolds Bosworth was seen and examined. History and physical has been reviewed. The patient has been examined.  There have been no significant clinical changes since the completion of the originally dated History and Physical.    Signed By: Barber Verduzco MD     May 11, 2020 9:48 AM

## 2020-05-11 NOTE — ANESTHESIA PREPROCEDURE EVALUATION
Anesthetic History     Increased risk of difficult airway (grade 2 view for last intubation) and PONV          Review of Systems / Medical History  Patient summary reviewed, nursing notes reviewed and pertinent labs reviewed    Pulmonary        Sleep apnea (central sleep apnea-dental appliance)           Neuro/Psych   Within defined limits           Cardiovascular    Hypertension (white coat syndrome)          Hyperlipidemia    Exercise tolerance: >4 METS     GI/Hepatic/Renal  Within defined limits   GERD: well controlled           Endo/Other      Hypothyroidism: well controlled  Arthritis and cancer (thyroid and melanoma)     Other Findings   Comments: Patient with chronic BLE edema, worsened since surgery 1/20. PCP aware, neg DVT w/u. Decreased mobility due to pain since surgery.            Physical Exam    Airway  Mallampati: II  TM Distance: 4 - 6 cm  Neck ROM: normal range of motion   Mouth opening: Normal     Cardiovascular    Rhythm: regular  Rate: normal         Dental    Dentition: Upper dentition intact, Lower dentition intact and Implants  Comments: Implant-back tooth   Pulmonary  Breath sounds clear to auscultation               Abdominal         Other Findings            Anesthetic Plan    ASA: 2  Anesthesia type: general            Anesthetic plan and risks discussed with: Patient

## 2020-05-11 NOTE — ANESTHESIA POSTPROCEDURE EVALUATION
Procedure(s):  REVISION LEFT L5 S1 DECOMPRESSION,REVISION RIGHT L4 L5 DECOMPRESSION,REMOVAL POSTERIOR INTERBODY FUSION L4 L5.    general    Anesthesia Post Evaluation      Multimodal analgesia: multimodal analgesia not used between 6 hours prior to anesthesia start to PACU discharge  Patient location during evaluation: PACU  Patient participation: complete - patient participated  Level of consciousness: awake and alert  Pain score: 3  Pain management: adequate  Airway patency: patent  Anesthetic complications: no  Cardiovascular status: hemodynamically stable and acceptable  Respiratory status: acceptable  Hydration status: acceptable  Comments: Patient seen and evaluated; no concerns. Post anesthesia nausea and vomiting:  none      Vitals Value Taken Time   /68 5/11/2020  4:00 PM   Temp 36.6 °C (97.8 °F) 5/11/2020  3:50 PM   Pulse 89 5/11/2020  4:05 PM   Resp 7 5/11/2020  4:05 PM   SpO2 99 % 5/11/2020  4:05 PM   Vitals shown include unvalidated device data.

## 2020-05-12 LAB
ANION GAP SERPL CALC-SCNC: 4 MMOL/L (ref 5–15)
BUN SERPL-MCNC: 16 MG/DL (ref 6–20)
BUN/CREAT SERPL: 28 (ref 12–20)
CALCIUM SERPL-MCNC: 8.4 MG/DL (ref 8.5–10.1)
CHLORIDE SERPL-SCNC: 109 MMOL/L (ref 97–108)
CO2 SERPL-SCNC: 28 MMOL/L (ref 21–32)
CREAT SERPL-MCNC: 0.58 MG/DL (ref 0.55–1.02)
GLUCOSE SERPL-MCNC: 130 MG/DL (ref 65–100)
HGB BLD-MCNC: 11.1 G/DL (ref 11.5–16)
POTASSIUM SERPL-SCNC: 4.1 MMOL/L (ref 3.5–5.1)
SODIUM SERPL-SCNC: 141 MMOL/L (ref 136–145)

## 2020-05-12 PROCEDURE — 74011250637 HC RX REV CODE- 250/637: Performed by: ORTHOPAEDIC SURGERY

## 2020-05-12 PROCEDURE — 65270000029 HC RM PRIVATE

## 2020-05-12 PROCEDURE — 94760 N-INVAS EAR/PLS OXIMETRY 1: CPT

## 2020-05-12 PROCEDURE — 36415 COLL VENOUS BLD VENIPUNCTURE: CPT

## 2020-05-12 PROCEDURE — 74011250637 HC RX REV CODE- 250/637: Performed by: PHYSICIAN ASSISTANT

## 2020-05-12 PROCEDURE — 97165 OT EVAL LOW COMPLEX 30 MIN: CPT

## 2020-05-12 PROCEDURE — 74011250636 HC RX REV CODE- 250/636: Performed by: ORTHOPAEDIC SURGERY

## 2020-05-12 PROCEDURE — L0627 LO SAG RI AN/POS PNL PRE CST: HCPCS

## 2020-05-12 PROCEDURE — 74011250636 HC RX REV CODE- 250/636: Performed by: NURSE PRACTITIONER

## 2020-05-12 PROCEDURE — 97116 GAIT TRAINING THERAPY: CPT

## 2020-05-12 PROCEDURE — 80048 BASIC METABOLIC PNL TOTAL CA: CPT

## 2020-05-12 PROCEDURE — 97530 THERAPEUTIC ACTIVITIES: CPT

## 2020-05-12 PROCEDURE — 77010033678 HC OXYGEN DAILY

## 2020-05-12 PROCEDURE — 97161 PT EVAL LOW COMPLEX 20 MIN: CPT

## 2020-05-12 PROCEDURE — 85018 HEMOGLOBIN: CPT

## 2020-05-12 PROCEDURE — 74011000250 HC RX REV CODE- 250: Performed by: ORTHOPAEDIC SURGERY

## 2020-05-12 PROCEDURE — 97535 SELF CARE MNGMENT TRAINING: CPT

## 2020-05-12 RX ORDER — DEXAMETHASONE SODIUM PHOSPHATE 10 MG/ML
10 INJECTION INTRAMUSCULAR; INTRAVENOUS ONCE
Status: COMPLETED | OUTPATIENT
Start: 2020-05-12 | End: 2020-05-12

## 2020-05-12 RX ADMIN — OXYCODONE 10 MG: 5 TABLET ORAL at 17:48

## 2020-05-12 RX ADMIN — GABAPENTIN 100 MG: 100 CAPSULE ORAL at 08:33

## 2020-05-12 RX ADMIN — WATER 2 G: 1 INJECTION INTRAMUSCULAR; INTRAVENOUS; SUBCUTANEOUS at 01:28

## 2020-05-12 RX ADMIN — WATER 2 G: 1 INJECTION INTRAMUSCULAR; INTRAVENOUS; SUBCUTANEOUS at 09:41

## 2020-05-12 RX ADMIN — Medication 10 ML: at 22:00

## 2020-05-12 RX ADMIN — GABAPENTIN 100 MG: 100 CAPSULE ORAL at 21:29

## 2020-05-12 RX ADMIN — CYCLOBENZAPRINE 10 MG: 10 TABLET, FILM COATED ORAL at 16:18

## 2020-05-12 RX ADMIN — MUPIROCIN: 20 OINTMENT TOPICAL at 09:50

## 2020-05-12 RX ADMIN — GABAPENTIN 100 MG: 100 CAPSULE ORAL at 16:14

## 2020-05-12 RX ADMIN — SODIUM CHLORIDE 125 ML/HR: 900 INJECTION, SOLUTION INTRAVENOUS at 00:15

## 2020-05-12 RX ADMIN — SODIUM CHLORIDE 125 ML/HR: 900 INJECTION, SOLUTION INTRAVENOUS at 06:16

## 2020-05-12 RX ADMIN — DEXAMETHASONE SODIUM PHOSPHATE 10 MG: 10 INJECTION, SOLUTION INTRAMUSCULAR; INTRAVENOUS at 18:20

## 2020-05-12 RX ADMIN — OXYCODONE 5 MG: 5 TABLET ORAL at 14:53

## 2020-05-12 RX ADMIN — ATORVASTATIN CALCIUM 20 MG: 20 TABLET, FILM COATED ORAL at 17:48

## 2020-05-12 RX ADMIN — POLYETHYLENE GLYCOL 3350 17 G: 17 POWDER, FOR SOLUTION ORAL at 08:33

## 2020-05-12 RX ADMIN — DOCUSATE SODIUM 50MG AND SENNOSIDES 8.6MG 1 TABLET: 8.6; 5 TABLET, FILM COATED ORAL at 08:33

## 2020-05-12 RX ADMIN — OXYCODONE 10 MG: 5 TABLET ORAL at 20:26

## 2020-05-12 RX ADMIN — OXYCODONE 5 MG: 5 TABLET ORAL at 11:19

## 2020-05-12 RX ADMIN — MUPIROCIN: 20 OINTMENT TOPICAL at 18:21

## 2020-05-12 RX ADMIN — CHOLECALCIFEROL TAB 125 MCG (5000 UNIT) 5000 UNITS: 125 TAB at 08:33

## 2020-05-12 RX ADMIN — PANTOPRAZOLE SODIUM 40 MG: 40 TABLET, DELAYED RELEASE ORAL at 06:14

## 2020-05-12 RX ADMIN — Medication 10 ML: at 06:00

## 2020-05-12 RX ADMIN — LOSARTAN POTASSIUM 50 MG: 50 TABLET ORAL at 21:29

## 2020-05-12 RX ADMIN — LIOTHYRONINE SODIUM 5 MCG: 5 TABLET ORAL at 12:24

## 2020-05-12 RX ADMIN — Medication 10 ML: at 14:39

## 2020-05-12 RX ADMIN — DOCUSATE SODIUM 50MG AND SENNOSIDES 8.6MG 1 TABLET: 8.6; 5 TABLET, FILM COATED ORAL at 17:48

## 2020-05-12 RX ADMIN — LEVOTHYROXINE SODIUM 112 MCG: 112 TABLET ORAL at 06:14

## 2020-05-12 NOTE — PROGRESS NOTES
Occupational Therapy:  05/12/20    Orders received, chart reviewed and patient evaluated by occupational therapy. Pending progression with skilled acute occupational therapy, recommend:  Occupational therapy at least 2 days/week in the home AND ensure assist and/or supervision for safety with mobility and ADLs     Vitals:    05/12/20 1003 05/12/20 1010 05/12/20 1020   BP: 152/71 195/72 192/50   BP Patient Position: At rest Sitting Sitting;Post activity   SpO2: 99%  98%     Recommend with nursing patient to complete as able in order to maintain strength, endurance and independence: OOB to chair 3x/day with 1 person assistance and functional mobility to the bathroom with 1 person assistance. Thank you for your assistance. Full evaluation to follow.      Thank you,  Stuart Caceres OTR/L

## 2020-05-12 NOTE — PROGRESS NOTES
Problem: Falls - Risk of  Goal: *Absence of Falls  Description: Document Aurelio Austin Fall Risk and appropriate interventions in the flowsheet.   Outcome: Progressing Towards Goal  Note: Fall Risk Interventions:  Mobility Interventions: Communicate number of staff needed for ambulation/transfer, Patient to call before getting OOB    Medication Interventions: Patient to call before getting OOB, Teach patient to arise slowly    Elimination Interventions: Patient to call for help with toileting needs, Call light in reach

## 2020-05-12 NOTE — PROGRESS NOTES
CM Note:  Met with pt for d/c planning. PTA pt stated she was independent with ADL's but with some modifications. She drives but has not done so for the last 8 months. She has walked unaided. DME at home: RW, commode riser with support arms  Medications mostly from [facility] with short-term meds from [facility] on [address] Reason for Admission:  LUMBAR STENOSIS W/NEUROGENIC CLAUDICATION;                    RUR Score:                   13%-low  Plan for utilizing home health:      A referral was sent in [address] to [facility]    PCP: First and Last name:  Cha Scott   Name of Practice: [facility]   Are you a current patient: Yes/No: yes   Approximate date of last visit: last August                    Current Advanced Directive/Advance Care Plan: yes, at home not on file                         Transition of Care Plan:                    1. PT/OT following  2. Home with home health   3. F/u with providers as directed OP  4.  to transport her home at d/c  L. Sujey Pedersen RN    Care Management Interventions  PCP Verified by CM: Yes  Transition of Care Consult (CM Consult): [address]: (S) No  Reason Outside [place]: (patient choice)  Physical Therapy Consult: Yes  Occupational Therapy Consult: Yes  Current Support Network: Lives with Spouse, Own Home(2 story house with MBR on the ground floor.   There are 4 entry steps.)  Confirm Follow Up Transport: Family()  The Plan for Transition of Care is Related to the Following Treatment Goals : lumbar stenosis with neruongenic claudication  Freedom of Choice List was Provided with Basic Dialogue that Supports the Patient's Individualized Plan of Care/Goals, Treatment Preferences and Shares the Quality Data Associated with the Providers?: Yes  Discharge Location  Discharge Placement: Home with two level

## 2020-05-12 NOTE — FACE TO FACE
Rounded on patient, f/u from Spine Pre-op Patient Education. Patient states information was valuable in preparing for surgery. Reviewed incentive spirometry use and mobility precautions. Patient states their home space is prepared and safe. Questions answered.

## 2020-05-12 NOTE — PROGRESS NOTES
Problem: Self Care Deficits Care Plan (Adult)  Goal: *Acute Goals and Plan of Care (Insert Text)  Description: FUNCTIONAL STATUS PRIOR TO ADMISSION: Patient was modified independent using a rolling walker for functional mobility. At baseline, pt was independent with ADLs, however since initial back sx in January 2020, pt has required increased assistance with ADLs, especially lower body dressing ( assists with distal portions). HOME SUPPORT: The patient lived with  who is able to assist as needed. Occupational Therapy Goals  Initiated 5/12/2020    1. Patient will perform lower body dressing with minimal assistance/contact guard assist using AE PRN within 7 days. 2.  Patient will perform toileting and toilet transfer with supervision/set-up using most appropriate DME within 7 days. 3.  Patient will tolerate standing at sink for 3 minutes to perform grooming at supervision/set-up within 7 days. 4.  Patient will don/doff back brace at modified independence within 7 days. 5.  Patient will verbalize/demonstrate 3/3 back precautions during ADL tasks without cues within 7 days. Outcome: Progressing Towards Goal     OCCUPATIONAL THERAPY EVALUATION  Patient: Efe Winston (89 y.o. female)  Date: 5/12/2020  Primary Diagnosis: Lumbar stenosis with neurogenic claudication [M48.062]  Procedure(s) (LRB):  REVISION LEFT L5 S1 DECOMPRESSION,REVISION RIGHT L4 L5 DECOMPRESSION,REMOVAL POSTERIOR INTERBODY FUSION L4 L5 (N/A) 1 Day Post-Op   Precautions: Fall; Back (LSO)       ASSESSMENT  Based on the objective data described below, the patient presents with impaired balance, elevated pain, decreased activity tolerance, and impaired ADL performance and mobility from baseline. Pt is POD 1 s/p revision spinal surgery, cleared to participate with therapy. Pt is alert and oriented x4 but demonstrates mild confusion throughout session.  She requires cues for back precautions during bed mobility and for safety with rolling walker during transfers and functional tasks. She is receptive to all education and requires physical assistance for distal portions of lower body dressing. Written handout provided to increase clarity and carry-over. She reports difficulty with AE in the past and may benefit from re-education to reduce caregiver burden. She is left up in chair with PT present. Will continue to follow for acute skilled OT services. Anticipate pt will be able to discharge home with  and follow up [facility] therapy services. Current Level of Function Impacting Discharge (ADLs/self-care): min A to manage LSO, min to mod A lower body dressing, set up to min A seated grooming; CGA to min A mobility     Functional Outcome Measure: The patient scored 55/100 on the Barthel outcome measure which is indicative of moderate functional impairment. Other factors to consider for discharge: back precautions; lives with ; recent back surgery     Patient will benefit from skilled therapy intervention to address the above noted impairments. PLAN :  Recommendations and Planned Interventions: self care training, functional mobility training, therapeutic exercise, balance training, therapeutic activities, endurance activities, patient education, home safety training and family training/education    Frequency/Duration: Patient will be followed by occupational therapy 5 times a week to address goals. Recommendation for discharge: (in order for the patient to meet his/her long term goals)  Occupational therapy at least 2 days/week in the home     This discharge recommendation:  Has not yet been discussed the attending provider and/or case management    IF patient discharges home will need the following DME: patient owns DME required for discharge       SUBJECTIVE:   Patient stated I have never been able to figure out those sock things.     OBJECTIVE DATA SUMMARY:   HISTORY:   Past Medical History:   Diagnosis Date    Allergic rhinitis     Arthritis     Osteo    Bilateral leg edema 01/30/2020    Post lumbar surgery    Central sleep apnea     uses a mouth piece    Chronic pain     arthritis pain    Difficult intubation 2011    during [facility] surgery at [facility]- anesthesia [facility] in [facility] care reported no complications      GERD (gastroesophageal reflux disease)     Hx of echocardiogram 01/28/2019    Normal- [facility]ass    Hyperlipidemia     Hypertension     Well controlled with medications- see BP list in chart    Melanoma [facility] [address] 2012 & 2015    x 3    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    Nares    PONV (postoperative nausea and vomiting)     Prediabetes 01/21/2020    5.9    Thyroid cancer ([facility] [address] 2013    White coat syndrome with hypertension 2000    her BP will increase to 200's/100's- Cardio and PCP aware, runs normal at home     Past Surgical History:   Procedure Laterality Date    HX CHOLECYSTECTOMY  2006    HX DILATION AND CURETTAGE      over 40 years ago    HX HIP REPLACEMENT Right 2001    HX HYSTERECTOMY  2012    HX KNEE REPLACEMENT Left 2015    HX LUMBAR LAMINECTOMY N/A 01/27/2020    L2-L5    HX MALIGNANT SKIN LESION EXCISION  2012,2015,2017    HX MENISCUS REPAIR Left 2015    HX SHOULDER REPLACEMENT Bilateral 2011 & 2015    HX THYROIDECTOMY  2013       Expanded or extensive additional review of patient history:     Home Situation  Home Environment: Private residence  # Steps to Enter: 4  Rails to Enter: Yes  Hand Rails : Bilateral  One/Two Story Residence: Two story, live on 1st floor  Interior Rails: Both  Lift Chair Available: No  Living Alone: No  Support Systems: Spouse/Significant Other/Partner  Patient Expects to be Discharged to[de-identified] Private residence  Current DME Used/Available at Home: Blood pressure cuff, Cane, straight, Walker, rolling, Raised toilet seat  Tub or Shower Type: Shower(with built in bench)    Hand dominance: Right    EXAMINATION OF PERFORMANCE DEFICITS:  Cognitive/Behavioral Status:  Neurologic State: Alert  Orientation Level: Oriented X4  Cognition: Appropriate decision making; Follows commands  Perception: Appears intact  Perseveration: No perseveration noted  Safety/Judgement: Fall prevention; Insight into deficits    Hearing: Auditory  Auditory Impairment: None  Hearing Aids/Status: Does not own    Range of Motion:  AROM: Generally decreased, functional    Strength:  Strength: Generally decreased, functional    Coordination:  Coordination: Within functional limits  Fine Motor Skills-Upper: Left Intact; Right Intact    Gross Motor Skills-Upper: Left Intact; Right Intact    Tone & Sensation:  Tone: Normal    Balance:  Sitting: Intact  Standing: Impaired; With support  Standing - Static: Constant support;Good;Fair  Standing - Dynamic : Constant support; Fair    Functional Mobility and Transfers for ADLs:  Bed Mobility:  Rolling: Minimum assistance(heavy cues for log roll technique)  Supine to Sit: Minimum assistance  Sit to Supine: (Pt seated in chair at end of session)  Scooting: Contact guard assistance    Transfers:  Sit to Stand: Contact guard assistance;Minimum assistance;Assist x1  Stand to Sit: Contact guard assistance  Bed to Chair: Contact guard assistance;Assist x1;Additional time(with rolling walker)  Toilet Transfer : Minimum assistance(infer based on observations)    ADL Assessment:  Feeding: Independent    Oral Facial Hygiene/Grooming: Setup;Supervision(seated; infer CGA to min A standing at sink)    Bathing: Minimum assistance; Moderate assistance(seated; infer based on observations)    Upper Body Dressing: Setup;Minimum assistance(including LSO mgmt)    Lower Body Dressing: Moderate assistance(decreased reach to distal LEs)    Toileting: Moderate assistance(infer assist for bowel hygiene)    ADL Intervention and task modifications:  Patient instructed and demonstrated 3/3 back precautions with verbal cues.      Upper Body Dressing Assistance  Orthotics(Brace): Minimum assistance(for LSO brace)  Cues: Physical assistance; Tactile cues provided;Verbal cues provided;Visual cues provided    Lower Body Dressing Assistance  Socks: Total assistance (dependent)  Leg Crossed Method Used: No  Position Performed: Seated edge of bed  Cues: Don;Physical assistance    Cognitive Retraining  Safety/Judgement: Fall prevention; Insight into deficits    Patient instructed and indicated understanding the benefits of maintaining activity tolerance, functional mobility, and independence with self care tasks during acute stay  to ensure safe return home and to baseline. Encouraged patient to increase frequency and duration OOB, not sitting longer than 30 mins without marching/walking with staff, be out of bed for all meals, perform daily ADLs (as approved by RN/MD regarding bathing etc), and performing functional mobility to/from bathroom. Patient instruction and indicated understanding on body mechanics, ergonomics and gravitational force on the spine during different body positions to plan activities in prep for return home to complete basic ADLs, instrumental ADLs and back to work safely. Dressing brace: Patient instructed and demonstrated to don/doff velcro on brace using dominant side, keeping non-dominant side intact. Patient instructed and demonstrated in meantime of being able to stand with back against wall to don/doff brace, to don/doff seated using lap and bed/chair surface to support brace while manipulating. Dressing lower body: Patient instructed to don brace first and on the benefits to remain seated to don all clothing to increase independence with precautions and pain management. Toileting: Patient instructed on the benefits of using flushable wet wipes and toilet tongs if decreased reach or pain for mele care. Also, the benefits of a reacher to aid in clothing management.      Patient instruction and indicated understanding to not strain i.e. holding breath to bear down during a bowel movement, lifting/activity, and sexual activity. Home safety: Patient instructed and indicated understanding on home modifications and safety [raise height of ADL objects (i.e. clothing, sink items, fridge items, items to mouth when grooming), appropriate height of chair surfaces, recliner safety, change of floor surfaces, clear pathways] to increase independence and fall prevention. Standing: Patient instructed and indicated understanding to walk up to sink/counter top/surfaces, step into walker, square off while using objects, slide objects along surfaces, to increase adherence to back precautions and fall prevention. Patient instructed to increase amount of time standing in order to increase independence and tolerance with ADLs. During prolonged standing, can open cabinet door or place foot on stool to decrease spinal pressure/increase pain. Rolling Walker Safety: Educated patient about importance of keeping hands free at all times when using rolling walker for fall prevention. Provided information about walker bags/baskets/trays to assist with transporting items safely while in the home to prevent falls. Patient indicated understanding of same. Instructed patient on safe and appropriate hand placement during transfers (push up from sitting surface when standing up, reach back for sitting surface when sitting down, use grab bars, etc.), including never to pull up on rolling walker for fall prevention and safety. Instructed patient to push rolling walker up to surface (countertop, sink, etc.) and  it as opposed to abandoning rolling walker on the side for safety. Patient verbalized understanding of same. Functional Measure:  Barthel Index:    Bathin  Bladder: 10  Bowels: 10  Groomin  Dressin  Feeding: 10  Mobility: 0  Stairs: 0  Toilet Use: 5  Transfer (Bed to Chair and Back): 10  Total: 55/100        The Barthel ADL Index: Guidelines  1. The index should be used as a record of what a patient does, not as a record of what a patient could do. 2. The main aim is to establish degree of independence from any help, physical or verbal, however minor and for whatever reason. 3. The need for supervision renders the patient not independent. 4. A patient's performance should be established using the best available evidence. Asking the patient, friends/relatives and nurses are the usual sources, but direct observation and common sense are also important. However direct testing is not needed. 5. Usually the patient's performance over the preceding 24-48 hours is important, but occasionally longer periods will be relevant. 6. Middle categories imply that the patient supplies over 50 per cent of the effort. 7. Use of aids to be independent is allowed. Greg Sanchez., Barthel, D.W. ([id number]). Functional evaluation: the Barthel Index. [facility] (14)2. Matthew Burrows jaymie ANTOINE Izquierdo, Do Butler., Kurt Orantes., [place], [address] [place] [address] (1999). Measuring the change indisability after inpatient rehabilitation; comparison of the responsiveness of the Barthel Index and Functional Oliver Measure. Journal of Neurology, Neurosurgery, and Psychiatry, 66(4), 393-248. Shay Martinez, N.J.A, GT Jara, & Hudson Rivas M.A. (2004.) Assessment of post-stroke quality of life in cost-effectiveness studies: The usefulness of the Barthel Index and the EuroQoL-5D. Quality of Life Research, 15, 121-93     Occupational Therapy Evaluation Charge Determination   History Examination Decision-Making   LOW Complexity : Brief history review  MEDIUM Complexity : 3-5 performance deficits relating to physical, cognitive , or psychosocial skils that result in activity limitations and / or participation restrictions MEDIUM Complexity : Patient may present with comorbidities that affect occupational performnce.  Miniml to moderate modification of tasks or assistance (eg, physical or verbal ) with assesment(s) is necessary to enable patient to complete evaluation       Based on the above components, the patient evaluation is determined to be of the following complexity level: LOW   Pain Rating:  Pt reporting moderate pain this session    Activity Tolerance:   Fair and SpO2 stable on RA  Please refer to the flowsheet for vital signs taken during this treatment. After treatment patient left in no apparent distress:    Sitting in chair, Call bell within reach, Bed / chair alarm activated and PT present    COMMUNICATION/EDUCATION:   The patients plan of care was discussed with: Physical therapist and Registered nurse. Home safety education was provided and the patient/caregiver indicated understanding., Patient/family have participated as able in goal setting and plan of care. and Patient/family agree to work toward stated goals and plan of care. This patients plan of care is appropriate for delegation to [facility].     Thank you for this referral.  Govind Rojas OT  Time Calculation: 44 mins

## 2020-05-12 NOTE — PROGRESS NOTES
ORTHOPAEDIC LUMBAR SPINE SURGERY PROGRESS NOTE    NAME:     Lesley Jaeger   :       1945   MRN:       [id number]   DATE:      2020    POD:    1 Day Post-Op  S/P:    Procedure(s):  REVISION LEFT L5 S1 DECOMPRESSION,REVISION RIGHT L4 L5 DECOMPRESSION,REMOVAL POSTERIOR INTERBODY FUSION L4 L5    SUBJECTIVE:    C/O back pain along surgical incision  No leg pain or numbness  Denies nausea/vomiting, headache, chest pain or shortness of breath  Pain controlled    Recent Labs     20  0125   HGB 11.1*      K 4.1   *   CO2 28   BUN 16   CREA 0.58   *     Patient Vitals for the past 12 hrs:   BP Temp Pulse Resp SpO2   20 0747 126/74 98.1 °F (36.7 °C) 75 16 95 %   20 0352 114/63 97.9 °F (36.6 °C) 72 14 99 %   20 2341 107/49 97.7 °F (36.5 °C) 72 14 99 %   20 2204 159/82  79     20 130/73 97.4 °F (36.3 °C) 88 14 97 %       EXAM:  Dressings clean, dry and intact   Positive strength/ROM bilat lower ext.   Neuro intact to sensation  Calves, soft & nontender  BL LEs NVID      PLAN:  D/C PCA, change to PO pain medications  PT/OT, OOB w/ assist  Advance diet as tolerated      Marnie Padilla, [address]  Orthopaedic Surgery  Physician Assistant to Dr. Hien Johnston

## 2020-05-12 NOTE — PROGRESS NOTES
Order placed for Decadron 10 mg IV once NOW, per Verbal Order from Ronald Grover NP on 5/12/2020 due to pain radiating to left buttock.

## 2020-05-12 NOTE — PROGRESS NOTES
Problem: Mobility Impaired (Adult and Pediatric)  Goal: *Acute Goals and Plan of Care (Insert Text)  Description: FUNCTIONAL STATUS PRIOR TO ADMISSION: Patient was modified independent using a rolling walker for functional mobility. HOME SUPPORT PRIOR TO ADMISSION: The patient lived with  who assisted with mobility and ADLs. Physical Therapy Goals  Initiated 5/12/2020    1. Patient will move from supine to sit and sit to supine , scoot up and down and roll side to side in bed with modified independence within 4 days. 2. Patient will perform sit to stand with modified independence within 4 days. 3. Patient will ambulate with modified independence for 150 feet with the least restrictive device within 4 days. 4. Patient will ascend/descend 6 stairs with 1 handrail(s) with minimal assistance/contact guard assist within 4 days. 5. Patient will verbalize and demonstrate understanding of spinal precautions (No bending, lifting greater than 5 lbs, or twisting; log-roll technique; frequent repositioning as instructed) within 4 days. 5/12/2020 1705 by Yuliana Duran PT, DPT  Note:   PHYSICAL THERAPY TREATMENT  Patient: Reynolds Bosworth (55 y.o. female)  Date: 5/12/2020  Diagnosis: Lumbar stenosis with neurogenic claudication [M48.062]   <principal problem not specified>  Procedure(s) (LRB):  REVISION LEFT L5 S1 DECOMPRESSION,REVISION RIGHT L4 L5 DECOMPRESSION,REMOVAL POSTERIOR INTERBODY FUSION L4 L5 (N/A) 1 Day Post-Op  Precautions: Back, Bed Alarm, Fall No bending, no lifting greater than 5 lbs, no twisting, log-roll technique, repositioning every 20-30 min except when sleeping, brace when OOB (if ordered)  Chart, physical therapy assessment, plan of care and goals were reviewed. ASSESSMENT  Patient continues with skilled PT services and is progressing towards goals. Patient received this pm sitting in chair and in tears with pain. RN aware and has been diligently trying to get pain under control. Received lower dose of pain med at 3 pm and now that she has been sitting and moving, experiencing sciatica like symptom pattern. Patient with heavy pitting edema BLEs - would even OLRIE or Jobst stockings be of benefit? ? Patient tearful but wanting to try PT. Sit>stand CGA, gait CG A but only able to complete 25' this pm compared to morning session 70' x 2 when PCA in effect. Repoitioned back to supine with reverse log roll and mod A. Patient needing heavy cues for technique. Positioned for comfort with pillows and patient relaxing. She mentioning someone discussed possible use of IV medication for break through pain as well as steroids. Shared this with her RN. Patient recalls 3/3 spine precautions and fall dont call restriction. She is a one assist for OOB and to/from restroom with gait belt. Current Level of Function Impacting Discharge (mobility/balance): mod A for bed mobility, Deedee for transfers and gait. COG far behind RW and flexed trunk that patient unable to self correct     Other factors to consider for discharge: pain management, many steps         PLAN :  Patient continues to benefit from skilled intervention to address the above impairments. Continue treatment per established plan of care. to address goals.     Recommendation for discharge: (in order for the patient to meet his/her long term goals)  Physical therapy at least 2 days/week in the home     This discharge recommendation:  Has not yet been discussed the attending provider and/or case management    IF patient discharges home will need the following DME: none       SUBJECTIVE:   Patient stated .    OBJECTIVE DATA SUMMARY:   Critical Behavior:  Neurologic State: Alert  Orientation Level: Oriented X4  Cognition: Appropriate decision making, Appropriate for age attention/concentration, Appropriate safety awareness  Safety/Judgement: Fall prevention, Insight into deficits    Spinal diagnosis intervention:  The patient stated 3/3 back precautions when prompted. Reviewed all 3 back precautions, log roll technique, and sitting for 30 minutes at a time. The patient required heavy cues to maintain back precautions during functional activity. Reviewed back brace application and wear schedule. Brace donned with minimal assistance/contact guard assist Patient needs new brace for more firm support- will fit tomorrow. Functional Mobility Training:    Bed Mobility:  Log Rolling: Minimum assistance(heavy cues for log roll technique)  Supine to Sit: Minimum assistance  Sit to Supine: Moderate assistance  Scooting: Modified independent        Transfers:  Sit to Stand: Contact guard assistance;Minimum assistance;Assist x1  Stand to Sit: Contact guard assistance  Stand Pivot Transfers: Minimum assistance(heavy cueing to keep feet within RW frame)     Bed to Chair: Contact guard assistance;Assist x1;Additional time(with rolling walker)                    Balance:  Sitting: Intact  Standing: Impaired; With support  Standing - Static: Constant support;Good;Fair  Standing - Dynamic : Constant support; Fair  Ambulation/Gait Training:  Distance (ft): 25 Feet (ft)(70' this am, 25' this pm due to pain)  Assistive Device: Brace/Splint;Gait belt;Walker, rolling  Ambulation - Level of Assistance: Contact guard assistance(heavy cueing to keep feet within RW frame)        Gait Abnormalities: Antalgic;Decreased step clearance        Base of Support: Center of gravity altered     Speed/Colleen: Slow                       Stairs:     Stairs - Level of Assistance: (NT)      Pain Ratin/10    Activity Tolerance:   Poor- increased pain this evening  Please refer to the flowsheet for vital signs taken during this treatment. After treatment patient left in no apparent distress:   Supine in bed and Call bell within reach    COMMUNICATION/COLLABORATION:   The patients plan of care was discussed with: Registered nurse.      Thea King, PT, DPT   Time Calculation: 34 mins           5/12/2020 1139 by Valarie Burch, PT, DPT  Note:   PHYSICAL THERAPY EVALUATION  Patient: Gertrude Dobson (25 y.o. female)  Date: 5/12/2020  Primary Diagnosis: Lumbar stenosis with neurogenic claudication [M48.062]  Procedure(s) (LRB):  REVISION LEFT L5 S1 DECOMPRESSION,REVISION RIGHT L4 L5 DECOMPRESSION,REMOVAL POSTERIOR INTERBODY FUSION L4 L5 (N/A) 1 Day Post-Op   Precautions:  Back, Bed Alarm, Fall      ASSESSMENT  Based on the objective data described below, the patient presents with history of amb with left sided low back pain and worsening flexion posture following surgery in January 2020. Patient s/p day #1 revision spine surgery. She presents with low back pain, decreased ROM and strength of spine, decreased dynamic standing balance, and decreased tolerance for activity. Patient has decreased independence with basic functional mobility skills including rolling side to side, supine<>sitting, transfers and gait. Today she required heavy cueing for bed mobility and mod-min A x 1 as well as heavy cues during amb with CG A to amb 140'. She tends to keep COG and b feet outside frame of RW. No overt loss of balance but the potential is there especially during turning. Patient has a flight of steps to enter her home with the final step that is larger in height than the others. She has a RW and SPC at home and was using RW following January surgery. Anticipate patient should do well with PT BID to improve independence with functional mobility. Current Level of Function Impacting Discharge (mobility/balance): overall min A x 1 Bed mobility her biggest challenge. Functional Outcome Measure: The patient scored 55/100 on the Barthel Index outcome measure. Other factors to consider for discharge: stairs to enter home; pain management without PCA     Patient will benefit from skilled therapy intervention to address the above noted impairments.        PLAN :  Recommendations and Planned Interventions: bed mobility training, transfer training, gait training, therapeutic exercises, edema management/control, patient and family training/education, and therapeutic activities      Frequency/Duration: Patient will be followed by physical therapy:  twice daily to address goals. Recommendation for discharge: (in order for the patient to meet his/her long term goals)  Physical therapy at least 2 days/week in the home     This discharge recommendation:  Has not yet been discussed the attending provider and/or case management    IF patient discharges home will need the following DME: none         SUBJECTIVE:   Patient stated I am trying but this is about as straight as I can get.     OBJECTIVE DATA SUMMARY:   HISTORY:    Past Medical History:   Diagnosis Date    Allergic rhinitis     Arthritis     Osteo    Bilateral leg edema 01/30/2020    Post lumbar surgery    Central sleep apnea     uses a mouth piece    Chronic pain     arthritis pain    Difficult intubation 2011    during total surgery at [facility] 2011- anesthesia note in [facility] reported no complications      GERD (gastroesophageal reflux disease)     Hx of echocardiogram 01/28/2019    Normal- Dulce Garcia    Hyperlipidemia     Hypertension     Well controlled with medications- see BP list in chart    Melanoma ([facility] [address] 2012 & 2015    x 3    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    Nares    PONV (postoperative nausea and vomiting)     Prediabetes 01/21/2020    5.9    Thyroid cancer ([facility] [address] 2013    White coat syndrome with hypertension 2000    her BP will increase to 200's/100's- Cardio and PCP aware, runs normal at home     Past Surgical History:   Procedure Laterality Date    HX CHOLECYSTECTOMY  2006    HX DILATION AND CURETTAGE      over 40 years ago    HX HIP REPLACEMENT Right 2001    HX HYSTERECTOMY  2012    HX KNEE REPLACEMENT Left 2015    HX LUMBAR LAMINECTOMY N/A 01/27/2020    L2-L5    HX MALIGNANT SKIN LESION EXCISION  2012,2015,2017    HX MENISCUS REPAIR Left 2015    HX SHOULDER REPLACEMENT Bilateral 2011 & 2015    HX THYROIDECTOMY  2013       Personal factors and/or comorbidities impacting plan of care: recent spine surgery    Home Situation  Home Environment: Private residence  # Steps to Enter: 4  Rails to Enter: Yes  Hand Rails : Bilateral  One/Two Story Residence: Two story, live on 1st floor  Interior Rails: Both  Lift Chair Available: No  Living Alone: No  Support Systems: Spouse/Significant Other/Partner  Patient Expects to be Discharged to[de-identified] Private residence  Current DME Used/Available at Home: Blood pressure cuff, Cane, straight, Walker, rolling, Raised toilet seat  Tub or Shower Type: Shower(with built in bench)    EXAMINATION/PRESENTATION/DECISION MAKING:   Critical Behavior:  Neurologic State: Alert  Orientation Level: Oriented X4  Cognition: Appropriate decision making, Follows commands  Safety/Judgement: Fall prevention, Insight into deficits    SKIN: NOTED significant pitting edema BLEs    Range Of Motion:  AROM: Generally decreased, functional                       Strength:    Strength: Generally decreased, functional                    Tone & Sensation:   Tone: Normal                              Coordination:  Coordination: Within functional limits       Functional Mobility:  Bed Mobility:  Rolling: Minimum assistance(heavy cues for log roll technique)  Supine to Sit: Minimum assistance  Sit to Supine: Moderate assistance  Scooting: Modified independent  Transfers:  Sit to Stand: Contact guard assistance;Minimum assistance;Assist x1  Stand to Sit: Contact guard assistance  Stand Pivot Transfers: Minimum assistance(heavy cueing to keep feet within RW frame)     Bed to Chair: Contact guard assistance;Assist x1;Additional time(with rolling walker)              Balance:   Sitting: Intact  Standing: Impaired; With support  Standing - Static: Constant support;Good;Fair  Standing - Dynamic : Constant support; Fair  Ambulation/Gait Training:  Distance (ft): 70 Feet (ft)(x 2)  Assistive Device: Brace/Splint;Gait belt;Walker, rolling  Ambulation - Level of Assistance: Contact guard assistance(heavy cueing to keep feet within RW frame)        Gait Abnormalities: Antalgic;Decreased step clearance        Base of Support: Center of gravity altered     Speed/Colleen: Slow          Stairs - Level of Assistance: (NT)        Therapeutic Exercises: Ankle pumps      Functional Measure:  Barthel Index:    Bathin  Bladder: 10  Bowels: 10  Groomin  Dressin  Feeding: 10  Mobility: 0  Stairs: 0  Toilet Use: 5  Transfer (Bed to Chair and Back): 10  Total: 55/100       The Barthel ADL Index: Guidelines  1. The index should be used as a record of what a patient does, not as a record of what a patient could do. 2. The main aim is to establish degree of independence from any help, physical or verbal, however minor and for whatever reason. 3. The need for supervision renders the patient not independent. 4. A patient's performance should be established using the best available evidence. Asking the patient, friends/relatives and nurses are the usual sources, but direct observation and common sense are also important. However direct testing is not needed. 5. Usually the patient's performance over the preceding 24-48 hours is important, but occasionally longer periods will be relevant. 6. Middle categories imply that the patient supplies over 50 per cent of the effort. 7. Use of aids to be independent is allowed. Viola Baez., Barthel, D.W. (1957). Functional evaluation: the Barthel Index. [address] [facility] (14)2. Hermon Deiters jaymie [place], J.J.M.F, Lm Pedraza., Marielena Sheth, Lexy Brooke, [address] [place] [address] (). Measuring the change indisability after inpatient rehabilitation; comparison of the responsiveness of the Barthel Index and Functional Sims Measure. Journal of Neurology, Neurosurgery, and Psychiatry, 66(4), 735-118.   Akila Cortez [place], N.J.A, Mark Pablo M.A. (2004.) Assessment of post-stroke quality of life in cost-effectiveness studies: The usefulness of the Barthel Index and the EuroQoL-5D. Quality of Life Research, 15, 408-56           Physical Therapy Evaluation Charge Determination   History Examination Presentation Decision-Making   LOW Complexity : Zero comorbidities / personal factors that will impact the outcome / POC LOW Complexity : 1-2 Standardized tests and measures addressing body structure, function, activity limitation and / or participation in recreation  LOW Complexity : Stable, uncomplicated  LOW Complexity : FOTO score of       Based on the above components, the patient evaluation is determined to be of the following complexity level: LOW     Pain Ratin/10 left sided lumbar spine    Activity Tolerance:   Fair  Please refer to the flowsheet for vital signs taken during this treatment. After treatment patient left in no apparent distress:   Supine in bed, Call bell within reach, Bed / chair alarm activated, and Side rails x 3    COMMUNICATION/EDUCATION:   The patients plan of care was discussed with: Occupational therapist and Registered nurse. Fall prevention education was provided and the patient/caregiver indicated understanding. and Patient/family have participated as able in goal setting and plan of care.     Thank you for this referral.  Juan Antonio Peace, PT, DPT   Time Calculation: 36 mins

## 2020-05-12 NOTE — PROGRESS NOTES
Problem: Mobility Impaired (Adult and Pediatric)  Goal: *Acute Goals and Plan of Care (Insert Text)  Description: FUNCTIONAL STATUS PRIOR TO ADMISSION: Patient was modified independent using a rolling walker for functional mobility. HOME SUPPORT PRIOR TO ADMISSION: The patient lived with  who assisted with mobility and ADLs. Physical Therapy Goals  Initiated 5/12/2020    1. Patient will move from supine to sit and sit to supine , scoot up and down and roll side to side in bed with modified independence within 4 days. 2. Patient will perform sit to stand with modified independence within 4 days. 3. Patient will ambulate with modified independence for 150 feet with the least restrictive device within 4 days. 4. Patient will ascend/descend 6 stairs with 1 handrail(s) with minimal assistance/contact guard assist within 4 days. 5. Patient will verbalize and demonstrate understanding of spinal precautions (No bending, lifting greater than 5 lbs, or twisting; log-roll technique; frequent repositioning as instructed) within 4 days. Note:   PHYSICAL THERAPY EVALUATION  Patient: Alisa Galan (66 y.o. female)  Date: 5/12/2020  Primary Diagnosis: Lumbar stenosis with neurogenic claudication [M48.062]  Procedure(s) (LRB):  REVISION LEFT L5 S1 DECOMPRESSION,REVISION RIGHT L4 L5 DECOMPRESSION,REMOVAL POSTERIOR INTERBODY FUSION L4 L5 (N/A) 1 Day Post-Op   Precautions:  Back, Bed Alarm, Fall      ASSESSMENT  Based on the objective data described below, the patient presents with history of amb with left sided low back pain and worsening flexion posture following surgery in January 2020. Patient s/p day #1 revision spine surgery. She presents with low back pain, decreased ROM and strength of spine, decreased dynamic standing balance, and decreased tolerance for activity. Patient has decreased independence with basic functional mobility skills including rolling side to side, supine<>sitting, transfers and gait. Today she required heavy cueing for bed mobility and mod-min A x 1 as well as heavy cues during amb with CG A to amb 140'. She tends to keep COG and b feet outside frame of RW. No overt loss of balance but the potential is there especially during turning. Patient has a flight of steps to enter her home with the final step that is larger in height than the others. She has a RW and SPC at home and was using RW following January surgery. Anticipate patient should do well with PT BID to improve independence with functional mobility. Current Level of Function Impacting Discharge (mobility/balance): overall min A x 1 Bed mobility her biggest challenge. Functional Outcome Measure: The patient scored 55/100 on the Barthel Index outcome measure. Other factors to consider for discharge: stairs to enter home; pain management without PCA     Patient will benefit from skilled therapy intervention to address the above noted impairments. PLAN :  Recommendations and Planned Interventions: bed mobility training, transfer training, gait training, therapeutic exercises, edema management/control, patient and family training/education, and therapeutic activities      Frequency/Duration: Patient will be followed by physical therapy:  twice daily to address goals. Recommendation for discharge: (in order for the patient to meet his/her long term goals)  Physical therapy at least 2 days/week in the home     This discharge recommendation:  Has not yet been discussed the attending provider and/or case management    IF patient discharges home will need the following DME: none         SUBJECTIVE:   Patient stated I am trying but this is about as straight as I can get.     OBJECTIVE DATA SUMMARY:   HISTORY:    Past Medical History:   Diagnosis Date    Allergic rhinitis     Arthritis     Osteo    Bilateral leg edema 01/30/2020    Post lumbar surgery    Central sleep apnea     uses a mouth piece    Chronic pain     arthritis pain    Difficult intubation 2011    during total surgery at [facility] 2011- anesthesia note in 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    Nares    PONV (postoperative nausea and vomiting)     Prediabetes 01/21/2020    5.9    Thyroid cancer ([facility] [address] 2013    White coat syndrome with hypertension 2000    her BP will increase to 200's/100's- Cardio and PCP aware, runs normal at home     Past Surgical History:   Procedure Laterality Date    HX CHOLECYSTECTOMY  2006    HX DILATION AND CURETTAGE      over 40 years ago    HX HIP REPLACEMENT Right 2001    HX HYSTERECTOMY  2012    HX KNEE REPLACEMENT Left 2015    HX LUMBAR LAMINECTOMY N/A 01/27/2020    L2-L5    HX MALIGNANT SKIN LESION EXCISION  2012,2015,2017    HX MENISCUS REPAIR Left 2015    HX SHOULDER REPLACEMENT Bilateral 2011 & 2015    HX THYROIDECTOMY  2013       Personal factors and/or comorbidities impacting plan of care: recent spine surgery    Home Situation  Home Environment: Private residence  # Steps to Enter: 4  Rails to Enter: Yes  Hand Rails : Bilateral  One/Two Story Residence: Two story, live on 1st floor  Interior Rails: Both  Lift Chair Available: No  Living Alone: No  Support Systems: Spouse/Significant Other/Partner  Patient Expects to be Discharged to[de-identified] Private residence  Current DME Used/Available at Home: Blood pressure cuff, Cane, straight, Walker, rolling, Raised toilet seat  Tub or Shower Type: Shower(with built in bench)    EXAMINATION/PRESENTATION/DECISION MAKING:   Critical Behavior:  Neurologic State: Alert  Orientation Level: Oriented X4  Cognition: Appropriate decision making, Follows commands  Safety/Judgement: Fall prevention, Insight into deficits    SKIN: NOTED significant pitting edema BLEs    Range Of Motion:  AROM: Generally decreased, functional                       Strength:    Strength: Generally decreased, functional                    Tone & Sensation:   Tone: Normal                              Coordination:  Coordination: Within functional limits       Functional Mobility:  Bed Mobility:  Rolling: Minimum assistance(heavy cues for log roll technique)  Supine to Sit: Minimum assistance  Sit to Supine: Moderate assistance  Scooting: Modified independent  Transfers:  Sit to Stand: Contact guard assistance;Minimum assistance;Assist x1  Stand to Sit: Contact guard assistance  Stand Pivot Transfers: Minimum assistance(heavy cueing to keep feet within RW frame)     Bed to Chair: Contact guard assistance;Assist x1;Additional time(with rolling walker)              Balance:   Sitting: Intact  Standing: Impaired; With support  Standing - Static: Constant support;Good;Fair  Standing - Dynamic : Constant support; Fair  Ambulation/Gait Training:  Distance (ft): 70 Feet (ft)(x 2)  Assistive Device: Brace/Splint;Gait belt;Walker, rolling  Ambulation - Level of Assistance: Contact guard assistance(heavy cueing to keep feet within RW frame)        Gait Abnormalities: Antalgic;Decreased step clearance        Base of Support: Center of gravity altered     Speed/Colleen: Slow          Stairs - Level of Assistance: (NT)        Therapeutic Exercises: Ankle pumps      Functional Measure:  Barthel Index:    Bathin  Bladder: 10  Bowels: 10  Groomin  Dressin  Feeding: 10  Mobility: 0  Stairs: 0  Toilet Use: 5  Transfer (Bed to Chair and Back): 10  Total: 55/100       The Barthel ADL Index: Guidelines  1. The index should be used as a record of what a patient does, not as a record of what a patient could do. 2. The main aim is to establish degree of independence from any help, physical or verbal, however minor and for whatever reason.   3. The need for supervision renders the patient not independent. 4. A patient's performance should be established using the best available evidence. Asking the patient, friends/relatives and nurses are the usual sources, but direct observation and common sense are also important. However direct testing is not needed. 5. Usually the patient's performance over the preceding 24-48 hours is important, but occasionally longer periods will be relevant. 6. Middle categories imply that the patient supplies over 50 per cent of the effort. 7. Use of aids to be independent is allowed. Melania Kline., Barthel, D.W. (3940). Functional evaluation: the Barthel Index. [address] [facility] (14)2. BIN EvansF, Hudson Tam., Teena Keller., [facility], [address] [facility] (). Measuring the change indisability after inpatient rehabilitation; comparison of the responsiveness of the Barthel Index and Functional [place] Measure. Journal of Neurology, Neurosurgery, and Psychiatry, 66(4), 587-411. Yung Palacio, N.J.A, GT Jara, & Mat Moser MYADIRA. (2004.) Assessment of post-stroke quality of life in cost-effectiveness studies: The usefulness of the Barthel Index and the EuroQoL-5D. Quality of Life Research, 15, 973-37           Physical Therapy Evaluation Charge Determination   History Examination Presentation Decision-Making   LOW Complexity : Zero comorbidities / personal factors that will impact the outcome / POC LOW Complexity : 1-2 Standardized tests and measures addressing body structure, function, activity limitation and / or participation in recreation  LOW Complexity : Stable, uncomplicated  LOW Complexity : FOTO score of       Based on the above components, the patient evaluation is determined to be of the following complexity level: LOW     Pain Ratin/10 left sided lumbar spine    Activity Tolerance:   Fair  Please refer to the flowsheet for vital signs taken during this treatment.     After treatment patient left in no apparent distress:   Supine in bed, Call bell within reach, Bed / chair alarm activated, and Side rails x 3    COMMUNICATION/EDUCATION:   The patients plan of care was discussed with: Occupational therapist and Registered nurse. Fall prevention education was provided and the patient/caregiver indicated understanding. and Patient/family have participated as able in goal setting and plan of care.     Thank you for this referral.  Freddy Roe, PT, DPT   Time Calculation: 36 mins

## 2020-05-12 NOTE — PROGRESS NOTES
Ortho    Pt resting comfortably in bed. Pain controlled. No N/V. Radicular pain to left buttock feels the same as preop. May consider IV Decadron if symptoms continue. Continues with elevated blood pressures. Pt states she has white coat hypertension, and this happens every time she's in a medical office or building. She states her PCP is aware and managing her HTN. States her home SBP are usually 115-120. Progressing well with therapy. Overall doing well. Questions answered.      Reggie Puentes NP

## 2020-05-12 NOTE — PROGRESS NOTES
Ortho    Pt resting comfortably in bed without complaint. Pain controlled. Discussed plan of care and expectations for discharge. Wound is c/d/i. Post op antibiotics completed. Answered all questions.      Reggie Puentes NP

## 2020-05-13 VITALS
RESPIRATION RATE: 18 BRPM | DIASTOLIC BLOOD PRESSURE: 71 MMHG | TEMPERATURE: 98 F | SYSTOLIC BLOOD PRESSURE: 150 MMHG | OXYGEN SATURATION: 97 % | HEART RATE: 97 BPM

## 2020-05-13 LAB
ANION GAP SERPL CALC-SCNC: 4 MMOL/L (ref 5–15)
BUN SERPL-MCNC: 10 MG/DL (ref 6–20)
BUN/CREAT SERPL: 16 (ref 12–20)
CALCIUM SERPL-MCNC: 8.5 MG/DL (ref 8.5–10.1)
CHLORIDE SERPL-SCNC: 109 MMOL/L (ref 97–108)
CO2 SERPL-SCNC: 26 MMOL/L (ref 21–32)
CREAT SERPL-MCNC: 0.62 MG/DL (ref 0.55–1.02)
GLUCOSE SERPL-MCNC: 155 MG/DL (ref 65–100)
HGB BLD-MCNC: 12.3 G/DL (ref 11.5–16)
POTASSIUM SERPL-SCNC: 3.9 MMOL/L (ref 3.5–5.1)
SODIUM SERPL-SCNC: 139 MMOL/L (ref 136–145)

## 2020-05-13 PROCEDURE — 74011250637 HC RX REV CODE- 250/637: Performed by: PHYSICIAN ASSISTANT

## 2020-05-13 PROCEDURE — 74011250637 HC RX REV CODE- 250/637: Performed by: ORTHOPAEDIC SURGERY

## 2020-05-13 PROCEDURE — 97535 SELF CARE MNGMENT TRAINING: CPT | Performed by: OCCUPATIONAL THERAPIST

## 2020-05-13 PROCEDURE — 97116 GAIT TRAINING THERAPY: CPT

## 2020-05-13 PROCEDURE — 97530 THERAPEUTIC ACTIVITIES: CPT

## 2020-05-13 PROCEDURE — 36415 COLL VENOUS BLD VENIPUNCTURE: CPT

## 2020-05-13 PROCEDURE — 80048 BASIC METABOLIC PNL TOTAL CA: CPT

## 2020-05-13 PROCEDURE — 85018 HEMOGLOBIN: CPT

## 2020-05-13 PROCEDURE — 94760 N-INVAS EAR/PLS OXIMETRY 1: CPT

## 2020-05-13 PROCEDURE — 74011250636 HC RX REV CODE- 250/636: Performed by: PHYSICIAN ASSISTANT

## 2020-05-13 RX ORDER — DEXAMETHASONE SODIUM PHOSPHATE 10 MG/ML
10 INJECTION INTRAMUSCULAR; INTRAVENOUS
Status: COMPLETED | OUTPATIENT
Start: 2020-05-13 | End: 2020-05-13

## 2020-05-13 RX ORDER — DOCUSATE SODIUM 100 MG/1
100 CAPSULE, LIQUID FILLED ORAL 2 TIMES DAILY
Qty: 60 CAP | Refills: 0 | Status: SHIPPED | OUTPATIENT
Start: 2020-05-13

## 2020-05-13 RX ORDER — OXYCODONE HYDROCHLORIDE 5 MG/1
5-10 TABLET ORAL
Qty: 50 TAB | Refills: 0 | Status: SHIPPED | OUTPATIENT
Start: 2020-05-13 | End: 2020-05-20

## 2020-05-13 RX ORDER — CYCLOBENZAPRINE HCL 10 MG
10 TABLET ORAL
Qty: 30 TAB | Refills: 0 | Status: SHIPPED | OUTPATIENT
Start: 2020-05-13

## 2020-05-13 RX ADMIN — GABAPENTIN 100 MG: 100 CAPSULE ORAL at 15:03

## 2020-05-13 RX ADMIN — DOCUSATE SODIUM 50MG AND SENNOSIDES 8.6MG 1 TABLET: 8.6; 5 TABLET, FILM COATED ORAL at 08:24

## 2020-05-13 RX ADMIN — Medication 10 ML: at 13:43

## 2020-05-13 RX ADMIN — OXYCODONE 10 MG: 5 TABLET ORAL at 16:42

## 2020-05-13 RX ADMIN — CHOLECALCIFEROL TAB 125 MCG (5000 UNIT) 5000 UNITS: 125 TAB at 08:24

## 2020-05-13 RX ADMIN — OXYCODONE 10 MG: 5 TABLET ORAL at 10:30

## 2020-05-13 RX ADMIN — MUPIROCIN: 20 OINTMENT TOPICAL at 08:28

## 2020-05-13 RX ADMIN — OXYCODONE 10 MG: 5 TABLET ORAL at 03:15

## 2020-05-13 RX ADMIN — CYCLOBENZAPRINE 10 MG: 10 TABLET, FILM COATED ORAL at 15:03

## 2020-05-13 RX ADMIN — POLYETHYLENE GLYCOL 3350 17 G: 17 POWDER, FOR SOLUTION ORAL at 08:24

## 2020-05-13 RX ADMIN — CYCLOBENZAPRINE 10 MG: 10 TABLET, FILM COATED ORAL at 00:12

## 2020-05-13 RX ADMIN — OXYCODONE 10 MG: 5 TABLET ORAL at 13:41

## 2020-05-13 RX ADMIN — GABAPENTIN 100 MG: 100 CAPSULE ORAL at 08:24

## 2020-05-13 RX ADMIN — LIOTHYRONINE SODIUM 5 MCG: 5 TABLET ORAL at 13:41

## 2020-05-13 RX ADMIN — CYCLOBENZAPRINE 10 MG: 10 TABLET, FILM COATED ORAL at 06:56

## 2020-05-13 RX ADMIN — Medication 10 ML: at 06:00

## 2020-05-13 RX ADMIN — LEVOTHYROXINE SODIUM 112 MCG: 112 TABLET ORAL at 06:10

## 2020-05-13 RX ADMIN — OXYCODONE 10 MG: 5 TABLET ORAL at 00:12

## 2020-05-13 RX ADMIN — DEXAMETHASONE SODIUM PHOSPHATE 10 MG: 10 INJECTION, SOLUTION INTRAMUSCULAR; INTRAVENOUS at 08:19

## 2020-05-13 RX ADMIN — PANTOPRAZOLE SODIUM 40 MG: 40 TABLET, DELAYED RELEASE ORAL at 06:10

## 2020-05-13 RX ADMIN — OXYCODONE 10 MG: 5 TABLET ORAL at 06:14

## 2020-05-13 NOTE — PROGRESS NOTES
Problem: Falls - Risk of  Goal: *Absence of Falls  Description: Document Lisa Sharpe Fall Risk and appropriate interventions in the flowsheet. Outcome: Progressing Towards Goal  Note: Fall Risk Interventions:  Mobility Interventions: Bed/chair exit alarm, Communicate number of staff needed for ambulation/transfer, Patient to call before getting OOB, PT Consult for mobility concerns, Utilize walker, cane, or other assistive device, Utilize gait belt for transfers/ambulation         Medication Interventions: Bed/chair exit alarm, Patient to call before getting OOB, Teach patient to arise slowly, Utilize gait belt for transfers/ambulation    Elimination Interventions: Bed/chair exit alarm, Call light in reach, Patient to call for help with toileting needs, Stay With Me (per policy), Toilet paper/wipes in reach, Toileting schedule/hourly rounds              Problem: Patient Education: Go to Patient Education Activity  Goal: Patient/Family Education  Outcome: Progressing Towards Goal     Problem: Pressure Injury - Risk of  Goal: *Prevention of pressure injury  Description: Document Vidal Scale and appropriate interventions in the flowsheet.   Outcome: Progressing Towards Goal  Note: Pressure Injury Interventions:  Sensory Interventions: Keep linens dry and wrinkle-free, Maintain/enhance activity level, Minimize linen layers    Moisture Interventions: Absorbent underpads, Maintain skin hydration (lotion/cream)    Activity Interventions: Increase time out of bed, PT/OT evaluation    Mobility Interventions: HOB 30 degrees or less, PT/OT evaluation    Nutrition Interventions: Document food/fluid/supplement intake, Offer support with meals,snacks and hydration                     Problem: Patient Education: Go to Patient Education Activity  Goal: Patient/Family Education  Outcome: Progressing Towards Goal     Problem: Patient Education: Go to Patient Education Activity  Goal: Patient/Family Education  Outcome: Progressing Towards Goal     Problem: Patient Education: Go to Patient Education Activity  Goal: Patient/Family Education  Outcome: Progressing Towards Goal

## 2020-05-13 NOTE — PROGRESS NOTES
ORTHOPAEDIC LUMBAR FUSION PROGRESS NOTE    NAME:     Camille Peabody   :       1945   MRN:       [id number]   DATE:      2020    POD:    2 Days Post-Op  S/P:    Procedure(s):  REVISION LEFT L5 S1 DECOMPRESSION,REVISION RIGHT L4 L5 DECOMPRESSION,REMOVAL POSTERIOR INTERBODY FUSION L4 L5    SUBJECTIVE:    C/O back pain along surgical incision, having some radiating pain down the L leg (better than it was yesterday afternoon)  No leg numbness  Denies nausea/vomiting, headache, chest pain or shortness of breath      Recent Labs     20  0129   HGB 12.3      K 3.9   *   CO2 26   BUN 10   CREA 0.62   *     Patient Vitals for the past 12 hrs:   BP Temp Pulse Resp SpO2   20 2257 153/76 98.8 °F (37.1 °C) 88 14 94 %   20 2129 156/81  85         EXAM:  Dressings clean, dry and intact   Positive strength/ROM bilat lower ext.   Neuro intact to sensation  Calves, soft & nontender  BL LEs NVID      PLAN:  IV Decadron  Continue prn PO pain medications  PT/OT, OOB w/ assist  Tolerating diet      Tuyet Griffin [place]  Orthopaedic Surgery  Physician Assistant to Dr. Cheri Mcnamara

## 2020-05-13 NOTE — DISCHARGE INSTRUCTIONS
Lumbar Spinal Surgery Discharge Instructions   Dr. Ernesto Orantes  [phone]    Activity:    [address] [facility] You are going home a well person, be as active as possible. Your only exercise should be walking. Start with short frequent walks and increase your walking distance each day. Start with walking twice a day for 5 minutes and increase your distance each day 2-3 minutes until you reach 25 minutes twice a day. Limit the amount of time you sit to 20-30 minute intervals. Sitting for prolonged periods of time will be uncomfortable for you following your surgery.  No bending, lifting (of 5lbs or more), twisting, or straining.  Do not drive until your doctor states you may do so. However, you may ride in a car for short distances.  If you are required to wear a brace, you should wear it at all times when you are out of bed. You may remove it when sleeping unless your physician advises you against it.  When you are in the bed, you may lay on your back or on either side. Do not lie on your stomach.  You may resume sexual relations 3-4 weeks after surgery depending on how you are feeling.  Continue to use your incentive spirometer for deep breathing exercises. Driving:   You may not drive or return to work until instructed by your physician. However, you may ride in the car for short periods of time. Brace:   If you have a back brace, you should wear your brace at all times when you are out of bed. Do not wear the brace while in bed or showering.  Remember to always wear a cotton t-shirt underneath your brace.  Do not bend or twist when your brace is off. Diet:   You may resume your regular home diet as tolerated. If your throat is sore, you should eat soft foods for the first couple of days.  Be sure to drink plenty of fluids; it is important to keep yourself hydrated.  Avoid alcoholic beverages and ABSOLUTELY NO tobacco products.  Tobacco products will interfere with your healing. If you continue to use tobacco, you may end up needing another surgery in the future. Dressing: You have on a Prineo dressing. This is a waterproof bacteriostatic dressing that  requires no post-operative care. Please do not peel the dressing off, or apply any  oil based products, as they may expedite the deterioration of the mesh. The  dressing will slowly chip off on its own.  Do not rub or apply lotion or ointments to incision site. Shower:   You may shower 5 days after your surgery.  You may remove your brace during showers.  NO not use tub baths, swimming pools or Jacuzzis. Medications:  **Your prescriptions have been e-scribed to the pharmacy on file at Dr. Angel Romo office. **   Check with your physician before taking any anti-inflammatory medications. (Advil, Aleve, Ibuprofen, Aspirin)   Take your pain medication as directed   Do NOT take additional Tylenol if your prescribed pain medication has acetaminophen in it (Endocet/Percocet, Lortab, Norco)   It is important to have regular bowel movements. Pain medications can be constipating. Stool softeners, warm prune juice, increasing your water intake, and increasing fiber in your diet can help in preventing constipation.  Do NOT take laxatives if at all possible except in severe situations. It can result in a vicious cycle of constipation and diarrhea. Follow-Up    Please call ASAP to schedule your 1st post-operative appointment. This should be approximately 3 weeks from your surgery date. Notify your physician in you develop any of the following conditions:   Fever above 101 degrees for 24 hours.  Nausea or vomiting.  Severe headache.  Inability to urinate   Loss of bowel or bladder function (sudden onset of incontinence)   Changes in sensation in your extremities (numbness, tingling, loss of color).  Severe pain or pain not relieved by medications.    Redness, swelling, or drainage from your incision.  Persistent pain in the chest.    Pain in the calf of either leg.  Increased weakness (if this is greater than before your surgery). If you have any questions about your dressing contact your Orthopaedic Surgeons office. OFFICE OF DR. Francisco Jean Baptiste   [phone]  OUR NEW ADDRESS IS [address] [place] [address], [facility] [address] [facility]     ** IMPORTANT: UNDER YOUR HONEYCOMB DRESSING, YOU HAVE A PRINEO ADHESIVE MESH DIRECTLY OVER YOUR INCISION. THIS MESH WAS STERILELY GLUED TO YOUR SKIN DURING SURGERY. DO NOT REMOVE THIS. NO ONE ELSE SHOULD REMOVE IT EITHER. IT WILL COME OFF ON ITS OWN OVER TIME    YOUR HONEYCOMB DRESSING NEEDS TO BE REMOVED PRIOR TO SHOWERING. THEN THE PRINEO MESH CAN BE LEFT OPEN TO AIR    * WEAR YOUR BRACE AS ADVISED    * NO DRIVING UNTIL YOU ARE CLEARED TO DO SO BY YOUR SURGEON    * LIMIT LIFTING, BENDING AND TWISTING.  NO LIFTING MORE THAN 5 LBS    * MAKE SURE YOU ARE GETTING GOOD NUTRITION (Lean Protein, Vitamin D AND Calcium)    * DO NOT TAKE ANY NSAIDs FOR THE FIRST 3 MONTHS AFTER SURGERY (such as Advil/Ibuprofen/Motrin, Aleve/Naproxen/Naprosyn, Diclofenac, Celebrex, Meloxicam, Indomethacin, Goody's powder, BC powder etc.)    * NO NICOTINE PRODUCTS    * FULLY READ YOUR DISCHARGE INSTRUCTIONS

## 2020-05-13 NOTE — PROGRESS NOTES
PT NOTE- patient calling for PT to return to room with other questions. PT returned and answered. Encouraged patient as she seems apprehensive about going home even though was explained earlier.   Lisa Hoff, PT, DPT

## 2020-05-13 NOTE — PROGRESS NOTES
Problem: Mobility Impaired (Adult and Pediatric)  Goal: *Acute Goals and Plan of Care (Insert Text)  Description: FUNCTIONAL STATUS PRIOR TO ADMISSION: Patient was modified independent using a rolling walker for functional mobility. HOME SUPPORT PRIOR TO ADMISSION: The patient lived with  who assisted with mobility and ADLs. Physical Therapy Goals  Initiated 5/12/2020    1. Patient will move from supine to sit and sit to supine , scoot up and down and roll side to side in bed with modified independence within 4 days. 2. Patient will perform sit to stand with modified independence within 4 days. 3. Patient will ambulate with modified independence for 150 feet with the least restrictive device within 4 days. 4. Patient will ascend/descend 6 stairs with 1 handrail(s) with minimal assistance/contact guard assist within 4 days. 5. Patient will verbalize and demonstrate understanding of spinal precautions (No bending, lifting greater than 5 lbs, or twisting; log-roll technique; frequent repositioning as instructed) within 4 days. Note:   PHYSICAL THERAPY TREATMENT  Patient: Daniel Greco (16 y.o. female)  Date: 5/13/2020  Diagnosis: Lumbar stenosis with neurogenic claudication [M48.062]   <principal problem not specified>  Procedure(s) (LRB):  REVISION LEFT L5 S1 DECOMPRESSION,REVISION RIGHT L4 L5 DECOMPRESSION,REMOVAL POSTERIOR INTERBODY FUSION L4 L5 (N/A) 2 Days Post-Op  Precautions: Back, Fall No bending, no lifting greater than 5 lbs, no twisting, log-roll technique, repositioning every 20-30 min except when sleeping, brace when OOB (if ordered)  Chart, physical therapy assessment, plan of care and goals were reviewed. ASSESSMENT  Patient continues with skilled PT services and is progressing towards goals.  Patient's RW from home came set extra high because of her poor flexed trunk posture and understandably attempt to increase extension however patient complaining a lot of UE fatigue and weakness with UEs in 80 degrees elbow flexion. RW set lower and asked patient to extend elbows more. Patient reports her spine flexion with gait is about as severe as it has been and as severe as prior to previous and this spine surgery. Patient fitted with LSO with supportive side panels and reports increased pain relief with increased compression and support following this revision L5-S1 decompression and fusion. Patient negotiated stairs and distance ambulation with rest break in between. Should be safe to discharge soon. Will work on bed mobility this pm session    Current Level of Function Impacting Discharge (mobility/balance): close to MOD I ; still needing cues to keep LEs within RW frame and keep COG closer to RW. Difficult due to very flexed trunk posture that is long standing last 4 years    Other factors to consider for discharge:  assist at home         PLAN :  Patient continues to benefit from skilled intervention to address the above impairments. Continue treatment per established plan of care. to address goals. Recommendation for discharge: (in order for the patient to meet his/her long term goals)  Per Dr Albert Ran     This discharge recommendation:  Has not yet been discussed the attending provider and/or case management    IF patient discharges home will need the following DME: none       SUBJECTIVE:   Patient stated .    OBJECTIVE DATA SUMMARY:   Critical Behavior:  Neurologic State: Alert  Orientation Level: Oriented X4  Cognition: Follows commands  Safety/Judgement: Fall prevention, Insight into deficits    Spinal diagnosis intervention:  The patient stated 3/3 back precautions when prompted. Reviewed all 3 back precautions, log roll technique, and sitting for 30 minutes at a time. The patient required min  cues to maintain back precautions during functional activity. Reviewed back brace application and wear schedule.  Brace donned with minimal assistance/contact guard assist Functional Mobility Training:    Bed Mobility:  Log Rolling: (in chair on arrival; will need to practice bed mob this pm)                 Transfers:  Sit to Stand: Stand-by assistance  Stand to Sit: Stand-by assistance  Stand Pivot Transfers: Contact guard assistance     Bed to Chair: Contact guard assistance                    Balance:  Sitting: Intact; Without support  Standing - Static: Constant support;Good  Standing - Dynamic : Constant support; Fair  Ambulation/Gait Training:  Distance (ft): 80 Feet (ft)(x 2)  Assistive Device: Brace/Splint;Gait belt;Walker, rolling  Ambulation - Level of Assistance: Contact guard assistance        Gait Abnormalities: Antalgic;Decreased step clearance        Base of Support: Center of gravity altered     Speed/Colleen: Slow                  Stairs:  Number of Stairs Trained: 4  Stairs - Level of Assistance: Stand-by assistance   Rail Use: Both    Pain Ratin/10    Activity Tolerance:   Fair  Please refer to the flowsheet for vital signs taken during this treatment. After treatment patient left in no apparent distress:   Sitting in chair and Call bell within reach seated on cushion for comfort    COMMUNICATION/COLLABORATION:   The patients plan of care was discussed with: Registered nurse.      Rachael Hawkins PT, DPT   Time Calculation: 23 mins

## 2020-05-13 NOTE — PROGRESS NOTES
Patient has met criteria for discharge. Instructions  given to patient. Patient verbalized understanding of instructions and signed e-copy. IV D/C'd. Patient to call to schedule follow up appointment. Home via wheelchair with family. Oxy updated on paperwork to reflect accurate Last time received and Next dose. Patient aware.

## 2020-05-13 NOTE — PROGRESS NOTES
Problem: Self Care Deficits Care Plan (Adult)  Goal: *Acute Goals and Plan of Care (Insert Text)  Description: FUNCTIONAL STATUS PRIOR TO ADMISSION: Patient was modified independent using a rolling walker for functional mobility. At baseline, pt was independent with ADLs, however since initial back sx in January 2020, pt has required increased assistance with ADLs, especially lower body dressing ( assists with distal portions). HOME SUPPORT: The patient lived with  who is able to assist as needed. Occupational Therapy Goals  Initiated 5/12/2020    1. Patient will perform lower body dressing with minimal assistance/contact guard assist using AE PRN within 7 days. 2.  Patient will perform toileting and toilet transfer with supervision/set-up using most appropriate DME within 7 days. 3.  Patient will tolerate standing at sink for 3 minutes to perform grooming at supervision/set-up within 7 days. 4.  Patient will don/doff back brace at modified independence within 7 days. 5.  Patient will verbalize/demonstrate 3/3 back precautions during ADL tasks without cues within 7 days. Outcome: Progressing Towards Goal  OCCUPATIONAL THERAPY TREATMENT  Patient: Nash Choi (12 y.o. female)  Date: 5/13/2020  Diagnosis: Lumbar stenosis with neurogenic claudication [M48.062]   <principal problem not specified>  Procedure(s) (LRB):  REVISION LEFT L5 S1 DECOMPRESSION,REVISION RIGHT L4 L5 DECOMPRESSION,REMOVAL POSTERIOR INTERBODY FUSION L4 L5 (N/A) 2 Days Post-Op  Precautions: Back, Fall  Chart, occupational therapy assessment, plan of care, and goals were reviewed. ASSESSMENT  Patient continues with skilled OT services and is progressing towards goals. Overall supervision to SBA with ADL mobility and functional transfers however flexed posture using rolling walker and unable to achieve full trunk extension.  Patient declines instruction on equipment for dressing as she is going to have her  assist. At this time on target for discharge. Current Level of Function Impacting Discharge (ADLs: min assist LE dressing, SBA mobility with walker, min assist bathing    Other factors to consider for discharge: will have assist         PLAN :  Patient continues to benefit from skilled intervention to address the above impairments. Continue treatment per established plan of care. Recommend next OT session: grooming at sink, toileting    Recommendation for discharge: (in order for the patient to meet his/her long term goals)  No skilled occupational therapy/ follow up rehabilitation needs identified at this time. This discharge recommendation:  Has been made in collaboration with the attending provider and/or case management    IF patient discharges home will need the following DME: none       SUBJECTIVE:   Patient stated My  will help me with that.  re: donning socks    OBJECTIVE DATA SUMMARY:   Cognitive/Behavioral Status:  Neurologic State: Alert  Orientation Level: Oriented X4  Cognition: Follows commands; Appropriate decision making; Appropriate for age attention/concentration; Appropriate safety awareness  Perception: Appears intact  Perseveration: No perseveration noted  Safety/Judgement: Awareness of environment; Fall prevention;Home safety; Insight into deficits    Functional Mobility and Transfers for ADLs:  Bed Mobility:  Rolling: (in chair on arrival; will need to practice bed mob this pm)    Transfers:  Sit to Stand: Stand-by assistance  Functional Transfers  Bathroom Mobility: Stand-by assistance(flexed posture with rolling walker)  Toilet Transfer : Stand-by assistance; Adaptive equipment; Additional time;Assist x1  Bed to Chair: Contact guard assistance    Balance:  Sitting: Intact; Without support  Standing - Static: Constant support;Good  Standing - Dynamic : Constant support; Fair    ADL Intervention:   Instructed on body mechanics for sit to stand and stand to sit to prevent increased forward bending    Grooming  Grooming Assistance: Supervision  Position Performed: Standing  Washing Face: Supervision  Washing Hands: Supervision  Brushing Teeth: Supervision; Compensatory technique training       Lower Body Dressing Assistance  Socks: Total assistance (dependent)(declines use of sock aid)  Leg Crossed Method Used: No    Toileting  Toileting Assistance: Stand-by assistance  Bladder Hygiene: Stand-by assistance    Cognitive Retraining  Safety/Judgement: Awareness of environment; Fall prevention;Home safety; Insight into deficits      Pain:  No complaint    Activity Tolerance:   Good  Please refer to the flowsheet for vital signs taken during this treatment. After treatment patient left in no apparent distress:   Sitting in chair and Call bell within reach    COMMUNICATION/COLLABORATION:   The patients plan of care was discussed with: Physical therapist and Registered nurse.      Donnella Aase, OTR/L  Time Calculation: 21 mins

## 2020-05-13 NOTE — PROGRESS NOTES
5/13/2020     2:58 PM  Pt DC noted. Pt will DC home with [facility] with [facility], and DC clinicals were attached in [employer]. Pt's family will transport. Care Management Interventions  PCP Verified by CM: Yes  Transition of Care Consult (CM Consult): [address]: (S) No  Reason Outside [place]: (patient choice)  Physical Therapy Consult: Yes  Occupational Therapy Consult: Yes  Current Support Network: Lives with Spouse, Own Home(2 story house with MBR on the ground floor. There are 4 entry steps.)  Confirm Follow Up Transport: Family()  The Plan for Transition of Care is Related to the Following Treatment Goals : lumbar stenosis with neruongenic claudication  Freedom of Choice List was Provided with Basic Dialogue that Supports the Patient's Individualized Plan of Care/Goals, Treatment Preferences and Shares the Quality Data Associated with the Providers?: Yes  Discharge Location  Discharge Placement: Home with two level      11:53 AM  RUR:  10%  Risk Level: [x]Low []Moderate []High  Value-based purchasing: [] Yes [x] No  Bundle patient: [] Yes [x] No   Specify:     Transition of care plan:  1. Awaiting medical clearance and DC order. 2. Home with [facility] with [facility]. 3. Outpatient follow-up. 4. Pt's family to transport.

## 2020-05-13 NOTE — PROGRESS NOTES
Problem: Mobility Impaired (Adult and Pediatric)  Goal: *Acute Goals and Plan of Care (Insert Text)  Description: FUNCTIONAL STATUS PRIOR TO ADMISSION: Patient was modified independent using a rolling walker for functional mobility. HOME SUPPORT PRIOR TO ADMISSION: The patient lived with  who assisted with mobility and ADLs. Physical Therapy Goals  Initiated 5/12/2020    1. Patient will move from supine to sit and sit to supine , scoot up and down and roll side to side in bed with modified independence within 4 days. 2. Patient will perform sit to stand with modified independence within 4 days. 3. Patient will ambulate with modified independence for 150 feet with the least restrictive device within 4 days. 4. Patient will ascend/descend 6 stairs with 1 handrail(s) with minimal assistance/contact guard assist within 4 days. 5. Patient will verbalize and demonstrate understanding of spinal precautions (No bending, lifting greater than 5 lbs, or twisting; log-roll technique; frequent repositioning as instructed) within 4 days. 5/13/2020 1512 by Bertram Preciado, PT, DPT  Note:   PHYSICAL THERAPY TREATMENT WITH DISCHARGE  Patient: Catalina Smith (51 y.o. female)  Date: 5/13/2020  Diagnosis: Lumbar stenosis with neurogenic claudication [M48.062]   <principal problem not specified>  Procedure(s) (LRB):  REVISION LEFT L5 S1 DECOMPRESSION,REVISION RIGHT L4 L5 DECOMPRESSION,REMOVAL POSTERIOR INTERBODY FUSION L4 L5 (N/A) 2 Days Post-Op  Precautions: Back, Fall No bending, no lifting greater than 5 lbs, no twisting, log-roll technique, repositioning every 20-30 min except when sleeping, brace when OOB (if ordered)  Chart, physical therapy assessment, plan of care and goals were reviewed. ASSESSMENT  Patient continues with skilled PT services and has progressed towards goals. Spoke with NP before treatment and asked if something else keeping patient inpatient beyond clearance by OT/PT.  Explained to NP and patient, once she has another practice run with bed mobility and demonstrates she knows the technique, because she has assistance at home, she will have met PT criteria for discharge. NP talked with patient during PT and explained as well. Patient seem to be listening and understanding but then looked surprised \"oh today? \" PT worked with patient on bed mobility strategies and discussed different things she could modify at home to help be more independent. Patient complaining about discharge and having to ask  for support. Patient given gait belt and told she can use in the rare instance when she is sitting edge of bed and needs to get back to bed and  is unavailable to assist with legs. Shown and patient practiced using gait belt as a leg  to place each LE onto the bed while she is sitting in bed. Then encouraged to roll to side to lower upper body. She states  is 78 yo and she doesn't wish to tire him out. However patient has had this chronic pitting edema, heaviness BLE for over a year, and the plan since admission has been to discharge home with 's assistance. Patient has also been through this surgery before and knows the progression. Patient also has a dgt that she has said can provide assistance. Explained to patient that bed mobility is the most challenging task for every spine surgical patient and it will gradually improve with extensive time. Patient can roll and get out of bed independently - demonstrated this three times with PT during this practice session. For sit>supine , mod A for BLEs only. Patient's flexed posture and way in which she uses RW with COG and feet outside the frame is not a new problem nor one that can be alleviated with extended length of stay. Her posture and gait look awful and precarious but she is steady and functional. Patient educated and aware and is functional with her compensations for her deficits.  Cleared for discharge           PLAN :  Patient is cleared for discharge by physical therapy at this time. Rationale for discharge: Other: functional for discharge, close to pre op baseline     Recommendation for discharge: (in order for the patient to meet his/her long term goals)  Physical therapy at least 2 days/week in the home     This discharge recommendation:  Has not yet been discussed the attending provider and/or case management    IF patient discharges home will need the following DME: none       SUBJECTIVE:   Patient stated Leopold Das usually have people leave this late in the day? (1500)    OBJECTIVE DATA SUMMARY:   Critical Behavior:  Neurologic State: Alert  Orientation Level: Oriented X4  Cognition: Follows commands, Appropriate decision making, Appropriate for age attention/concentration, Appropriate safety awareness  Safety/Judgement: Awareness of environment, Fall prevention, Home safety, Insight into deficits    Spinal diagnosis intervention:  The patient stated 3/3 back precautions when prompted. Reviewed all 3 back precautions, log roll technique, and sitting for 30 minutes at a time. The patient required min cues to maintain back precautions during functional activity. She is unable to extend spine and this was pre op posture following initial surgery. Reviewed back brace application and wear schedule. Brace donned with supervision. Functional Mobility Training:    Bed Mobility:  Log Rolling: Supervision  Supine to Sit: Stand-by assistance(from flat supine and without use of bedrail)  Sit to Supine: Additional time(struggles but needs assist only for BLEs up onto the bed; )  Scooting: Modified independent        Transfers:  Sit to Stand: Modified independent  Stand to Sit: Modified independent  Stand Pivot Transfers: Modified independent     Bed to Chair: Contact guard assistance    Balance:  Sitting: Intact; Without support  Standing - Static: Constant support;Good  Standing - Dynamic : Constant support; Fair  Ambulation/Gait Training:  Distance (ft): 10 Feet (ft)(to/ from bathroom)  Assistive Device: Brace/Splint;Gait belt;Walker, rolling  Ambulation - Level of Assistance: Stand-by assistance        Gait Abnormalities: Antalgic; Shuffling gait        Base of Support: Center of gravity altered     Speed/Colleen: Slow    Stairs:  Number of Stairs Trained: 4  Stairs - Level of Assistance: (defer steps again - feels confident to perform)   Rail Use: Both    Pain Ratin/10    Activity Tolerance:   Fair and requires rest breaks  Please refer to the flowsheet for vital signs taken during this treatment. After treatment patient left in no apparent distress:   Supine in bed and Call bell within reach    COMMUNICATION/COLLABORATION:   The patients plan of care was discussed with: Registered nurse. Emily Soler, PT, DPT   Time Calculation: 26 mins           2020 1248 by Gary Benton PT, DPT  Note:   PHYSICAL THERAPY TREATMENT  Patient: Kassi David (4 y.o. female)  Date: 2020  Diagnosis: Lumbar stenosis with neurogenic claudication [M48.062]   <principal problem not specified>  Procedure(s) (LRB):  REVISION LEFT L5 S1 DECOMPRESSION,REVISION RIGHT L4 L5 DECOMPRESSION,REMOVAL POSTERIOR INTERBODY FUSION L4 L5 (N/A) 2 Days Post-Op  Precautions: Back, Fall No bending, no lifting greater than 5 lbs, no twisting, log-roll technique, repositioning every 20-30 min except when sleeping, brace when OOB (if ordered)  Chart, physical therapy assessment, plan of care and goals were reviewed. ASSESSMENT  Patient continues with skilled PT services and is progressing towards goals. Patient's RW from home came set extra high because of her poor flexed trunk posture and understandably attempt to increase extension however patient complaining a lot of UE fatigue and weakness with UEs in 80 degrees elbow flexion. RW set lower and asked patient to extend elbows more.  Patient reports her spine flexion with gait is about as severe as it has been and as severe as prior to previous and this spine surgery. Patient fitted with LSO with supportive side panels and reports increased pain relief with increased compression and support following this revision L5-S1 decompression and fusion. Patient negotiated stairs and distance ambulation with rest break in between. Should be safe to discharge soon. Will work on bed mobility this pm session    Current Level of Function Impacting Discharge (mobility/balance): close to MOD I ; still needing cues to keep LEs within RW frame and keep COG closer to RW. Difficult due to very flexed trunk posture that is long standing last 4 years    Other factors to consider for discharge:  assist at home         PLAN :  Patient continues to benefit from skilled intervention to address the above impairments. Continue treatment per established plan of care. to address goals. Recommendation for discharge: (in order for the patient to meet his/her long term goals)  Per Dr Kory Peter     This discharge recommendation:  Has not yet been discussed the attending provider and/or case management    IF patient discharges home will need the following DME: none       SUBJECTIVE:   Patient stated .    OBJECTIVE DATA SUMMARY:   Critical Behavior:  Neurologic State: Alert  Orientation Level: Oriented X4  Cognition: Follows commands  Safety/Judgement: Fall prevention, Insight into deficits    Spinal diagnosis intervention:  The patient stated 3/3 back precautions when prompted. Reviewed all 3 back precautions, log roll technique, and sitting for 30 minutes at a time. The patient required min  cues to maintain back precautions during functional activity. Reviewed back brace application and wear schedule.  Brace donned with minimal assistance/contact guard assist       Functional Mobility Training:    Bed Mobility:  Log Rolling: (in chair on arrival; will need to practice bed mob this pm)                 Transfers:  Sit to Stand: Stand-by assistance  Stand to Sit: Stand-by assistance  Stand Pivot Transfers: Contact guard assistance     Bed to Chair: Contact guard assistance                    Balance:  Sitting: Intact; Without support  Standing - Static: Constant support;Good  Standing - Dynamic : Constant support; Fair  Ambulation/Gait Training:  Distance (ft): 80 Feet (ft)(x 2)  Assistive Device: Brace/Splint;Gait belt;Walker, rolling  Ambulation - Level of Assistance: Contact guard assistance        Gait Abnormalities: Antalgic;Decreased step clearance        Base of Support: Center of gravity altered     Speed/Colleen: Slow                  Stairs:  Number of Stairs Trained: 4  Stairs - Level of Assistance: Stand-by assistance   Rail Use: Both    Pain Ratin/10    Activity Tolerance:   Fair  Please refer to the flowsheet for vital signs taken during this treatment. After treatment patient left in no apparent distress:   Sitting in chair and Call bell within reach seated on cushion for comfort    COMMUNICATION/COLLABORATION:   The patients plan of care was discussed with: Registered nurse.      Winnie Talbert PT, DPT   Time Calculation: 23 mins

## 2020-05-18 NOTE — DISCHARGE SUMMARY
DISCHARGE SUMMARY      Patient: Ian Corado                             Medical Record Number: [id number]                : 1945  Age: 67 y.o. Admit Date: 2020  Discharge Date: 2020  Admission Diagnosis: Lumbar stenosis with neurogenic claudication [M48.062]  Discharge Diagnosis: LUMBAR STENOSIS W/NEUROGENIC   Procedures: Procedure(s):  REVISION LEFT L5 S1 DECOMPRESSION,REVISION RIGHT L4 L5 DECOMPRESSION,REMOVAL POSTERIOR INTERBODY FUSION L4 L5  Surgeon: Mike Norman MD  Co-surgeon:   Assistants:   Anesthesia: General  Complications: None      History of Present Illness:  Ian Corado is a 67 y.o. female with a history of intractable low back and radiculopathy. Despite conservative management and after clinical and radiographic evaluation, it was determined that she suffered from lumbar stenosis  and lumbar spondylolisthesis and would benefit from Procedure(s):  REVISION LEFT L5 S1 DECOMPRESSION,REVISION RIGHT L4 L5 DECOMPRESSION,REMOVAL POSTERIOR INTERBODY FUSION L4 L5, which she consented to undergo after a discussion of the risks, benefits, alternatives, rehab concerns, and potential complications of surgery.     Hospital Course:  Ian Corado tolerated the procedure well. She was transferred  to the recovery room in stable condition. After a brief stay, the patient was then transferred to the Orthopedic floor. On postoperative day #1, the dressing was clean and dry and she was neurovascularly intact. The patient was afebrile and vital signs were stable. Calves were soft and non-tender bilaterally. Ian Corado made excellent progress with physical therapy and was discharged to Home with Home Health in stable condition on postoperative day 2.   She was provided with routine postoperative instructions and advised to follow up in my office in 3 weeks following discharge from the hospital.  She was given prescriptions for medication to control post-operative symptoms.     Discharge Medications: Discharge Medication List as of 5/13/2020  3:57 PM             START taking these medications     Details   oxyCODONE IR (ROXICODONE) 5 mg immediate release tablet Take 1-2 Tabs by mouth every six (6) hours as needed for Pain for up to 7 days. Max Daily Amount: 40 mg. Indications: pain, Normal, Disp-50 Tab, R-0S/p Orthopaedic Surgery. Call [phone] with questions.       docusate sodium (COLACE) 100 mg capsule Take 1 Cap by mouth two (2) times a day., Normal, Disp-60 Cap, R-0       cyclobenzaprine (FLEXERIL) 10 mg tablet Take 1 Tab by mouth three (3) times daily as needed for Muscle Spasm(s). , Normal, Disp-30 Tab, R-0Call [phone] with questions                 CONTINUE these medications which have NOT CHANGED     Details   mupirocin (BACTROBAN) 2 % ointment by Both Nostrils route two (2) times a day for 5 days. Apply pea size amount, squeeze nose together. Use clean Q-tip, Normal, Disp-22 g, R-0       estrogens, conjugated (PREMARIN PO) Take 0.325 mg by mouth daily. , Historical Med       gabapentin (Gralise) 300 mg Tb24 Take 1 Tab by mouth daily. , Historical Med       pantoprazole (PROTONIX) 40 mg tablet Take 40 mg by mouth daily. , Historical Med       coenzyme q10-vitamin e (COQ10 ) 100-100 mg-unit cap Take 200 mg by mouth daily (after breakfast). , Historical Med       levothyroxine (SYNTHROID) 112 mcg tablet Take 112 mcg by mouth every morning. NO GENERIC>>>>MUST HAVE BRAND NAME, Historical Med       liothyronine (CYTOMEL) 5 mcg tablet Take 5 mcg by mouth daily (after lunch). , Historical Med       losartan (COZAAR) 50 mg tablet Take 50 mg by mouth nightly., Historical Med       atorvastatin (LIPITOR) 20 mg tablet Take 20 mg by mouth every evening. Indications: high cholesterol, Historical Med       cetirizine (ZYRTEC) 10 mg tablet Take 10 mg by mouth nightly as needed.  Indications: SEASONAL ALLERGIC RHINITIS, Historical Med       senna-docusate (PERICOLACE) 8.6-50 mg per tablet Take 1 Tab by mouth two (2) times a day., Normal, Disp-60 Tab, R-0       vit A/vit C/vit E/zinc/copper (PRESERVISION AREDS PO) Take 1 Tab by mouth two (2) times a day., Historical Med       cholecalciferol (VITAMIN D3) (5000 Units/125 mcg) tab tablet Take 5,000 Units by mouth daily. , Historical Med       potassium 99 mg tablet Take 99 mg by mouth daily. , Historical Med       cyanocobalamin (VITAMIN B-12) 1,000 mcg tablet Take 2,500 mcg by mouth daily (after breakfast). , Historical Med       MULTI-VITAMIN PO Take 1 Tab by mouth daily (after breakfast). , Historical Med                STOP taking these medications         omega-3 acid ethyl esters (Lovaza) 1 gram capsule Comments:   Reason for Stopping:            HYDROcodone-acetaminophen (NORCO) 5-325 mg per tablet Comments:   Reason for Stopping:                    Isela Choudhury  5/13/2020  Orthopaedic Surgery  Physician Assistant to Dr. Luis Nieto

## 2020-05-18 NOTE — PROGRESS NOTES
DISCHARGE SUMMARY     Patient: Isaias Springer                             Medical Record Number: [id number]                : 1945  Age: 67 y.o. Admit Date: 2020  Discharge Date: 2020  Admission Diagnosis: Lumbar stenosis with neurogenic claudication [M48.062]  Discharge Diagnosis: LUMBAR STENOSIS W/NEUROGENIC   Procedures: Procedure(s):  REVISION LEFT L5 S1 DECOMPRESSION,REVISION RIGHT L4 L5 DECOMPRESSION,REMOVAL POSTERIOR INTERBODY FUSION L4 L5  Surgeon: Brock Talbert MD  Co-surgeon:   Assistants:   Anesthesia: General  Complications: None     History of Present Illness:  Isaias Springer is a 67 y.o. female with a history of intractable low back and radiculopathy. Despite conservative management and after clinical and radiographic evaluation, it was determined that she suffered from lumbar stenosis  and lumbar spondylolisthesis and would benefit from Procedure(s):  REVISION LEFT L5 S1 DECOMPRESSION,REVISION RIGHT L4 L5 DECOMPRESSION,REMOVAL POSTERIOR INTERBODY FUSION L4 L5, which she consented to undergo after a discussion of the risks, benefits, alternatives, rehab concerns, and potential complications of surgery. Hospital Course:  Isaias Springer tolerated the procedure well. She was transferred  to the recovery room in stable condition. After a brief stay, the patient was then transferred to the Orthopedic floor. On postoperative day #1, the dressing was clean and dry and she was neurovascularly intact. The patient was afebrile and vital signs were stable. Calves were soft and non-tender bilaterally. Isaias Springer made excellent progress with physical therapy and was discharged to Home with Home Health in stable condition on postoperative day 2. She was provided with routine postoperative instructions and advised to follow up in my office in 3 weeks following discharge from the hospital.  She was given prescriptions for medication to control post-operative symptoms.     Discharge Medications:  Discharge Medication List as of 5/13/2020  3:57 PM      START taking these medications    Details   oxyCODONE IR (ROXICODONE) 5 mg immediate release tablet Take 1-2 Tabs by mouth every six (6) hours as needed for Pain for up to 7 days. Max Daily Amount: 40 mg. Indications: pain, Normal, Disp-50 Tab, R-0S/p Orthopaedic Surgery. Call [phone] with questions. docusate sodium (COLACE) 100 mg capsule Take 1 Cap by mouth two (2) times a day., Normal, Disp-60 Cap, R-0      cyclobenzaprine (FLEXERIL) 10 mg tablet Take 1 Tab by mouth three (3) times daily as needed for Muscle Spasm(s). , Normal, Disp-30 Tab, R-0Call [phone] with questions         CONTINUE these medications which have NOT CHANGED    Details   mupirocin (BACTROBAN) 2 % ointment by Both Nostrils route two (2) times a day for 5 days. Apply pea size amount, squeeze nose together. Use clean Q-tip, Normal, Disp-22 g, R-0      estrogens, conjugated (PREMARIN PO) Take 0.325 mg by mouth daily. , Historical Med      gabapentin (Gralise) 300 mg Tb24 Take 1 Tab by mouth daily. , Historical Med      pantoprazole (PROTONIX) 40 mg tablet Take 40 mg by mouth daily. , Historical Med      coenzyme q10-vitamin e (COQ10 ) 100-100 mg-unit cap Take 200 mg by mouth daily (after breakfast). , Historical Med      levothyroxine (SYNTHROID) 112 mcg tablet Take 112 mcg by mouth every morning. NO GENERIC>>>>MUST HAVE BRAND NAME, Historical Med      liothyronine (CYTOMEL) 5 mcg tablet Take 5 mcg by mouth daily (after lunch). , Historical Med      losartan (COZAAR) 50 mg tablet Take 50 mg by mouth nightly., Historical Med      atorvastatin (LIPITOR) 20 mg tablet Take 20 mg by mouth every evening. Indications: high cholesterol, Historical Med      cetirizine (ZYRTEC) 10 mg tablet Take 10 mg by mouth nightly as needed.  Indications: SEASONAL ALLERGIC RHINITIS, Historical Med      senna-docusate (PERICOLACE) 8.6-50 mg per tablet Take 1 Tab by mouth two (2) times a day., Normal, Disp-60 Tab, R-0      vit A/vit C/vit E/zinc/copper (PRESERVISION AREDS PO) Take 1 Tab by mouth two (2) times a day., Historical Med      cholecalciferol (VITAMIN D3) (5000 Units/125 mcg) tab tablet Take 5,000 Units by mouth daily. , Historical Med      potassium 99 mg tablet Take 99 mg by mouth daily. , Historical Med      cyanocobalamin (VITAMIN B-12) 1,000 mcg tablet Take 2,500 mcg by mouth daily (after breakfast). , Historical Med      MULTI-VITAMIN PO Take 1 Tab by mouth daily (after breakfast). , Historical Med         STOP taking these medications       omega-3 acid ethyl esters (Lovaza) 1 gram capsule Comments:   Reason for Stopping:         HYDROcodone-acetaminophen ([facility]) 5-325 mg per tablet Comments:   Reason for Stopping:               VALENTIN Rodríguez  5/13/2020  Orthopaedic Surgery  Physician Assistant to Dr. Georgina Winston

## 2020-06-08 ENCOUNTER — HOSPITAL ENCOUNTER (OUTPATIENT)
Dept: VASCULAR SURGERY | Age: 75
Discharge: HOME | End: 2020-06-08
Attending: ORTHOPAEDIC SURGERY
Payer: MEDICARE

## 2020-06-08 DIAGNOSIS — M79.89 LEG SWELLING: ICD-10-CM

## 2020-06-08 DIAGNOSIS — Z98.1 S/P LUMBAR SPINAL FUSION: ICD-10-CM

## 2020-06-08 PROCEDURE — 93970 EXTREMITY STUDY: CPT

## 2022-03-19 PROBLEM — M48.062 LUMBAR STENOSIS WITH NEUROGENIC CLAUDICATION: Status: ACTIVE | Noted: 2020-01-27

## 2023-05-11 RX ORDER — CETIRIZINE HYDROCHLORIDE 10 MG/1
TABLET ORAL
COMMUNITY
Start: 2011-05-19

## 2023-05-11 RX ORDER — LIOTHYRONINE SODIUM 5 UG/1
TABLET ORAL
COMMUNITY

## 2023-05-11 RX ORDER — AMOXICILLIN 250 MG
1 CAPSULE ORAL 2 TIMES DAILY
COMMUNITY
Start: 2020-01-30

## 2023-05-11 RX ORDER — GABAPENTIN 300 MG/1
1 TABLET, FILM COATED ORAL DAILY
COMMUNITY

## 2023-05-11 RX ORDER — LOSARTAN POTASSIUM 50 MG/1
50 TABLET ORAL NIGHTLY
COMMUNITY

## 2023-05-11 RX ORDER — ATORVASTATIN CALCIUM 20 MG/1
TABLET, FILM COATED ORAL EVERY EVENING
COMMUNITY

## 2023-05-11 RX ORDER — PANTOPRAZOLE SODIUM 40 MG/1
40 TABLET, DELAYED RELEASE ORAL DAILY
COMMUNITY

## 2023-05-11 RX ORDER — CYCLOBENZAPRINE HCL 10 MG
TABLET ORAL 3 TIMES DAILY PRN
COMMUNITY
Start: 2020-05-13

## 2023-05-11 RX ORDER — PSEUDOEPHEDRINE HCL 30 MG
TABLET ORAL 2 TIMES DAILY
COMMUNITY
Start: 2020-05-13

## 2023-05-11 RX ORDER — LEVOTHYROXINE SODIUM 112 UG/1
TABLET ORAL
COMMUNITY

## (undated) DEVICE — MAGNETIC INSTR DRAPE 20X16: Brand: MEDLINE INDUSTRIES, INC.

## (undated) DEVICE — SYR 20ML LL STRL LF --

## (undated) DEVICE — SPONGE GZ W4XL4IN COT 12 PLY TYP VII WVN C FLD DSGN

## (undated) DEVICE — DRAPE,UTILITY,TAPE,15X26,STERILE: Brand: MEDLINE

## (undated) DEVICE — PREP KIT PEEL PTCH POVIDONE IOD

## (undated) DEVICE — TOTAL TRAY, 16FR 10ML SIL FOLEY, URN: Brand: MEDLINE

## (undated) DEVICE — TUBING, SUCTION, 1/4" X 12', STRAIGHT: Brand: MEDLINE

## (undated) DEVICE — BIPOLAR FORCEPS CORD: Brand: VALLEYLAB

## (undated) DEVICE — SPONGE DRAIN NONWOVEN 4X4IN -- 2/PK

## (undated) DEVICE — SUTURE GOR TX SZ 5-0 L24IN NONABSORBABLE L13MM TTC-13 3/8 6K04A

## (undated) DEVICE — INFECTION CONTROL KIT SYS

## (undated) DEVICE — TIP CLEANER: Brand: VALLEYLAB

## (undated) DEVICE — OPTIFOAM GENTLE SA, POSTOP, 4X10: Brand: MEDLINE

## (undated) DEVICE — ELECTRODE BLDE L4IN NONINSULATED EDGE

## (undated) DEVICE — GOWN,SIRUS,NONRNF,SETINSLV,XL,20/CS: Brand: MEDLINE

## (undated) DEVICE — Device

## (undated) DEVICE — SUTURE VCRL SZ 0 L36IN ABSRB UD L36MM CT-1 1/2 CIR J946H

## (undated) DEVICE — NDL SPNE QNCKE 18GX3.5IN LF --

## (undated) DEVICE — TOOL 14MH30 LEGEND 14CM 3MM: Brand: MIDAS REX ™

## (undated) DEVICE — 3M™ TEGADERM™ TRANSPARENT FILM DRESSING FRAME STYLE, 1626, 4 IN X 4-3/4 IN (10 CM X 12 CM), 50/CT 4CT/CASE: Brand: 3M™ TEGADERM™

## (undated) DEVICE — ACCY PA100-A LEGEND LUB/DIFFUSER 4 PACK: Brand: MIDAS REX®

## (undated) DEVICE — ADHESIVE SKIN CLOSURE 4X22 CM PREMIERPRO EXOFINFUSION DISP

## (undated) DEVICE — STERILE POLYISOPRENE POWDER-FREE SURGICAL GLOVES: Brand: PROTEXIS

## (undated) DEVICE — STERILE POLYISOPRENE POWDER-FREE SURGICAL GLOVES WITH EMOLLIENT COATING: Brand: PROTEXIS

## (undated) DEVICE — 1010 S-DRAPE TOWEL DRAPE 10/BX: Brand: STERI-DRAPE™

## (undated) DEVICE — SYR LR LCK 1ML GRAD NSAF 30ML --

## (undated) DEVICE — NEEDLE HYPO 18GA L1.5IN PNK S STL HUB POLYPR SHLD REG BVL

## (undated) DEVICE — HANDPIECE SET WITH HIGH FLOW TIP AND SUCTION TUBE: Brand: INTERPULSE

## (undated) DEVICE — JACKSON TABLE POSITIONER KIT: Brand: MEDLINE INDUSTRIES, INC.

## (undated) DEVICE — LAMINECTOMY RICHMOND-LF: Brand: MEDLINE INDUSTRIES, INC.

## (undated) DEVICE — CODMAN® SURGICAL PATTIES 3/4" X 3/4" (1.91CM X 1.91CM): Brand: CODMAN®

## (undated) DEVICE — COVER LT HNDL PLAS RIG 1 PER PK

## (undated) DEVICE — SUTURE STRATAFIX SPRL SZ 1 L14IN ABSRB VLT L48CM CTX 1/2 SXPD2B405

## (undated) DEVICE — THE MILL DISPOSABLE - MEDIUM

## (undated) DEVICE — KIT EVAC 0.13IN RECT TB DIA10FR 400CC PVC 3 SPR Y CONN DRN

## (undated) DEVICE — SPONGE LAP 18X18IN STRL -- 5/PK

## (undated) DEVICE — SOLUTION IRRIG 1000ML H2O STRL BLT

## (undated) DEVICE — CATHETER IV 14GA L1.25IN TEF STR HUB INTROCAN SFTY

## (undated) DEVICE — SUTURE MCRYL SZ 3-0 L27IN ABSRB UD L24MM PS-1 3/8 CIR PRIM Y936H

## (undated) DEVICE — INTENDED FOR TISSUE SEPARATION, AND OTHER PROCEDURES THAT REQUIRE A SHARP SURGICAL BLADE TO PUNCTURE OR CUT.: Brand: BARD-PARKER ® CARBON RIB-BACK BLADES

## (undated) DEVICE — DRAPE,REIN 53X77,STERILE: Brand: MEDLINE

## (undated) DEVICE — SUTURE VCRL SZ 2-0 L36IN ABSRB UD L36MM CT-1 1/2 CIR J945H

## (undated) DEVICE — FLOSEAL HEMOSTATIC MATRIX, 10ML: Brand: FLOSEAL HEMOSTATIC MATRIX

## (undated) DEVICE — ALCOHOL RUBBING ISO 16OZ 70%

## (undated) DEVICE — SUTURE VCRL SZ 2-0 L27IN ABSRB UD L26MM CT-2 1/2 CIR J269H

## (undated) DEVICE — CONTAINER,SPECIMEN,3OZ,OR STRL: Brand: MEDLINE

## (undated) DEVICE — SYR 10ML LUER LOK 1/5ML GRAD --

## (undated) DEVICE — 3M™ TEGADERM™ TRANSPARENT FILM DRESSING FRAME STYLE, 1624W, 2-3/8 IN X 2-3/4 IN (6 CM X 7 CM), 100/CT 4CT/CASE: Brand: 3M™ TEGADERM™

## (undated) DEVICE — COVER,MAYO STAND,STERILE: Brand: MEDLINE

## (undated) DEVICE — AEGIS 1" DISK 4MM HOLE, PEEL OPEN: Brand: MEDLINE

## (undated) DEVICE — BONE WAX WHITE: Brand: BONE WAX WHITE

## (undated) DEVICE — (D)PREP SKN CHLRAPRP APPL 26ML -- CONVERT TO ITEM 371833

## (undated) DEVICE — COVER,MAYO STAND,XL,STERILE: Brand: MEDLINE

## (undated) DEVICE — STRAP,POSITIONING,KNEE/BODY,FOAM,4X60": Brand: MEDLINE

## (undated) DEVICE — SUTURE VCRL + SZ 1-0 L36IN ABSRB UD CTX 1/2 CIR TAPR PNT VCP977H

## (undated) DEVICE — CANISTER, RIGID, 3000CC: Brand: MEDLINE INDUSTRIES, INC.

## (undated) DEVICE — COVER,TABLE,60X90,STERILE: Brand: MEDLINE

## (undated) DEVICE — SUTURE VCRL SZ 1 L36IN ABSRB UD L36MM CT-1 1/2 CIR J947H

## (undated) DEVICE — DRAPE XR C ARM 41X74IN LF --

## (undated) DEVICE — DECANTER BAG 9": Brand: MEDLINE INDUSTRIES, INC.